# Patient Record
Sex: MALE | Employment: OTHER | URBAN - METROPOLITAN AREA
[De-identification: names, ages, dates, MRNs, and addresses within clinical notes are randomized per-mention and may not be internally consistent; named-entity substitution may affect disease eponyms.]

---

## 2018-01-30 PROBLEM — N18.30 STAGE 3 CHRONIC KIDNEY DISEASE (HCC): Status: ACTIVE | Noted: 2018-01-30

## 2018-01-30 PROBLEM — I25.10 CORONARY ARTERY DISEASE INVOLVING NATIVE CORONARY ARTERY OF NATIVE HEART WITHOUT ANGINA PECTORIS: Status: ACTIVE | Noted: 2018-01-30

## 2018-01-30 PROBLEM — I10 ESSENTIAL HYPERTENSION: Status: ACTIVE | Noted: 2018-01-30

## 2018-02-18 ENCOUNTER — APPOINTMENT (OUTPATIENT)
Dept: GENERAL RADIOLOGY | Facility: HOSPITAL | Age: 73
DRG: 215 | End: 2018-02-18
Attending: EMERGENCY MEDICINE
Payer: MEDICARE

## 2018-02-18 ENCOUNTER — HOSPITAL ENCOUNTER (INPATIENT)
Facility: HOSPITAL | Age: 73
LOS: 9 days | Discharge: HOME HEALTH | DRG: 215 | End: 2018-02-28
Attending: EMERGENCY MEDICINE | Admitting: HOSPITALIST
Payer: MEDICARE

## 2018-02-18 DIAGNOSIS — I25.10 CORONARY ARTERY DISEASE INVOLVING NATIVE CORONARY ARTERY OF NATIVE HEART WITHOUT ANGINA PECTORIS: ICD-10-CM
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
## 2018-02-18 DIAGNOSIS — N18.9 CHRONIC RENAL FAILURE, UNSPECIFIED CKD STAGE: ICD-10-CM

## 2018-02-18 DIAGNOSIS — J18.9 NOSOCOMIAL PNEUMONIA: Primary | ICD-10-CM

## 2018-02-18 DIAGNOSIS — N18.30 STAGE 3 CHRONIC KIDNEY DISEASE (HCC): ICD-10-CM

## 2018-02-18 DIAGNOSIS — E13.8 DM (DIABETES MELLITUS), SECONDARY, WITH COMPLICATIONS (HCC): ICD-10-CM

## 2018-02-18 DIAGNOSIS — I10 ESSENTIAL HYPERTENSION: ICD-10-CM

## 2018-02-18 DIAGNOSIS — I50.9 ACUTE ON CHRONIC CONGESTIVE HEART FAILURE, UNSPECIFIED HEART FAILURE TYPE (HCC): ICD-10-CM

## 2018-02-18 LAB
ALBUMIN SERPL-MCNC: 3.3 G/DL (ref 3.5–4.8)
ALP LIVER SERPL-CCNC: 42 U/L (ref 45–117)
ALT SERPL-CCNC: 28 U/L (ref 17–63)
AST SERPL-CCNC: 26 U/L (ref 15–41)
BASOPHILS # BLD AUTO: 0.05 X10(3) UL (ref 0–0.1)
BASOPHILS NFR BLD AUTO: 0.5 %
BILIRUB SERPL-MCNC: 1.2 MG/DL (ref 0.1–2)
BUN BLD-MCNC: 58 MG/DL (ref 8–20)
CALCIUM BLD-MCNC: 9.1 MG/DL (ref 8.3–10.3)
CHLORIDE: 102 MMOL/L (ref 101–111)
CO2: 24 MMOL/L (ref 22–32)
CREAT BLD-MCNC: 3.84 MG/DL (ref 0.7–1.3)
EOSINOPHIL # BLD AUTO: 0 X10(3) UL (ref 0–0.3)
EOSINOPHIL NFR BLD AUTO: 0 %
ERYTHROCYTE [DISTWIDTH] IN BLOOD BY AUTOMATED COUNT: 15.5 % (ref 11.5–16)
GLUCOSE BLD-MCNC: 218 MG/DL (ref 70–99)
HCT VFR BLD AUTO: 36.2 % (ref 37–53)
HGB BLD-MCNC: 11.8 G/DL (ref 13–17)
IMMATURE GRANULOCYTE COUNT: 0.05 X10(3) UL (ref 0–1)
IMMATURE GRANULOCYTE RATIO %: 0.5 %
LYMPHOCYTES # BLD AUTO: 1.02 X10(3) UL (ref 0.9–4)
LYMPHOCYTES NFR BLD AUTO: 10 %
M PROTEIN MFR SERPL ELPH: 7.7 G/DL (ref 6.1–8.3)
MCH RBC QN AUTO: 32 PG (ref 27–33.2)
MCHC RBC AUTO-ENTMCNC: 32.6 G/DL (ref 31–37)
MCV RBC AUTO: 98.1 FL (ref 80–99)
MONOCYTES # BLD AUTO: 1.09 X10(3) UL (ref 0.1–1)
MONOCYTES NFR BLD AUTO: 10.7 %
NEUTROPHIL ABS PRELIM: 7.97 X10 (3) UL (ref 1.3–6.7)
NEUTROPHILS # BLD AUTO: 7.97 X10(3) UL (ref 1.3–6.7)
NEUTROPHILS NFR BLD AUTO: 78.3 %
PLATELET # BLD AUTO: 197 10(3)UL (ref 150–450)
POTASSIUM SERPL-SCNC: 4 MMOL/L (ref 3.6–5.1)
RBC # BLD AUTO: 3.69 X10(6)UL (ref 3.8–5.8)
RED CELL DISTRIBUTION WIDTH-SD: 55.8 FL (ref 35.1–46.3)
SODIUM SERPL-SCNC: 135 MMOL/L (ref 136–144)
WBC # BLD AUTO: 10.2 X10(3) UL (ref 4–13)

## 2018-02-18 PROCEDURE — 71045 X-RAY EXAM CHEST 1 VIEW: CPT | Performed by: EMERGENCY MEDICINE

## 2018-02-19 ENCOUNTER — APPOINTMENT (OUTPATIENT)
Dept: CV DIAGNOSTICS | Facility: HOSPITAL | Age: 73
DRG: 215 | End: 2018-02-19
Attending: INTERNAL MEDICINE
Payer: MEDICARE

## 2018-02-19 ENCOUNTER — APPOINTMENT (OUTPATIENT)
Dept: ULTRASOUND IMAGING | Facility: HOSPITAL | Age: 73
DRG: 215 | End: 2018-02-19
Attending: INTERNAL MEDICINE
Payer: MEDICARE

## 2018-02-19 PROBLEM — J18.9 NOSOCOMIAL PNEUMONIA: Status: ACTIVE | Noted: 2018-02-19

## 2018-02-19 PROBLEM — D64.9 ANEMIA: Status: ACTIVE | Noted: 2018-02-19

## 2018-02-19 PROBLEM — E87.1 HYPONATREMIA: Status: ACTIVE | Noted: 2018-02-19

## 2018-02-19 PROBLEM — N18.9 CHRONIC RENAL FAILURE, UNSPECIFIED CKD STAGE: Status: ACTIVE | Noted: 2018-02-19

## 2018-02-19 PROBLEM — I50.9 ACUTE ON CHRONIC CONGESTIVE HEART FAILURE, UNSPECIFIED HEART FAILURE TYPE (HCC): Status: ACTIVE | Noted: 2018-02-19
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
## 2018-02-19 PROBLEM — R73.9 HYPERGLYCEMIA: Status: ACTIVE | Noted: 2018-02-19

## 2018-02-19 LAB
ATRIAL RATE: 71 BPM
BETA NATRIURETIC PEPTIDE: 1892 PG/ML (ref 2–99)
BILIRUB UR QL STRIP.AUTO: NEGATIVE
BUN BLD-MCNC: 58 MG/DL (ref 8–20)
CALCIUM BLD-MCNC: 8.6 MG/DL (ref 8.3–10.3)
CHLORIDE: 100 MMOL/L (ref 101–111)
CO2: 23 MMOL/L (ref 22–32)
COLOR UR AUTO: YELLOW
CREAT BLD-MCNC: 3.84 MG/DL (ref 0.7–1.3)
CREAT UR-SCNC: 78.4 MG/DL
EST. AVERAGE GLUCOSE BLD GHB EST-MCNC: 177 MG/DL (ref 68–126)
GLUCOSE BLD-MCNC: 214 MG/DL (ref 65–99)
GLUCOSE BLD-MCNC: 219 MG/DL (ref 70–99)
GLUCOSE BLD-MCNC: 260 MG/DL (ref 65–99)
GLUCOSE BLD-MCNC: 309 MG/DL (ref 65–99)
GLUCOSE BLD-MCNC: 335 MG/DL (ref 65–99)
GLUCOSE UR STRIP.AUTO-MCNC: NEGATIVE MG/DL
HBA1C MFR BLD HPLC: 7.8 % (ref ?–5.7)
HYALINE CASTS #/AREA URNS AUTO: PRESENT /LPF
KETONES UR STRIP.AUTO-MCNC: NEGATIVE MG/DL
LACTIC ACID: 1.3 MMOL/L (ref 0.5–2)
LACTIC ACID: 1.6 MMOL/L (ref 0.5–2)
LACTIC ACID: 1.6 MMOL/L (ref 0.5–2)
NITRITE UR QL STRIP.AUTO: NEGATIVE
P AXIS: 39 DEGREES
P-R INTERVAL: 172 MS
PH UR STRIP.AUTO: 5 [PH] (ref 4.5–8)
POTASSIUM SERPL-SCNC: 3.5 MMOL/L (ref 3.6–5.1)
PROT UR STRIP.AUTO-MCNC: NEGATIVE MG/DL
PROT UR-MCNC: 28.5 MG/DL
Q-T INTERVAL: 408 MS
QRS DURATION: 90 MS
QTC CALCULATION (BEZET): 443 MS
R AXIS: 1 DEGREES
RBC UR QL AUTO: NEGATIVE
SODIUM SERPL-SCNC: 136 MMOL/L (ref 136–144)
SODIUM SERPL-SCNC: 28 MMOL/L
SP GR UR STRIP.AUTO: 1.01 (ref 1–1.03)
T AXIS: -36 DEGREES
UROBILINOGEN UR STRIP.AUTO-MCNC: <2 MG/DL
VENTRICULAR RATE: 71 BPM

## 2018-02-19 PROCEDURE — 5A09457 ASSISTANCE WITH RESPIRATORY VENTILATION, 24-96 CONSECUTIVE HOURS, CONTINUOUS POSITIVE AIRWAY PRESSURE: ICD-10-PCS | Performed by: HOSPITALIST

## 2018-02-19 PROCEDURE — 99223 1ST HOSP IP/OBS HIGH 75: CPT | Performed by: INTERNAL MEDICINE

## 2018-02-19 PROCEDURE — 93306 TTE W/DOPPLER COMPLETE: CPT | Performed by: INTERNAL MEDICINE

## 2018-02-19 PROCEDURE — 76770 US EXAM ABDO BACK WALL COMP: CPT | Performed by: INTERNAL MEDICINE

## 2018-02-19 PROCEDURE — 99223 1ST HOSP IP/OBS HIGH 75: CPT | Performed by: HOSPITALIST

## 2018-02-19 RX ORDER — HEPARIN SODIUM 5000 [USP'U]/ML
5000 INJECTION, SOLUTION INTRAVENOUS; SUBCUTANEOUS EVERY 12 HOURS SCHEDULED
Status: DISCONTINUED | OUTPATIENT
Start: 2018-02-19 | End: 2018-02-28

## 2018-02-19 RX ORDER — FUROSEMIDE 10 MG/ML
40 INJECTION INTRAMUSCULAR; INTRAVENOUS ONCE
Status: COMPLETED | OUTPATIENT
Start: 2018-02-19 | End: 2018-02-19

## 2018-02-19 RX ORDER — FUROSEMIDE 80 MG
80 TABLET ORAL DAILY
Status: DISCONTINUED | OUTPATIENT
Start: 2018-02-19 | End: 2018-02-19

## 2018-02-19 RX ORDER — CLOPIDOGREL BISULFATE 75 MG/1
75 TABLET ORAL DAILY
Status: DISCONTINUED | OUTPATIENT
Start: 2018-02-19 | End: 2018-02-19

## 2018-02-19 RX ORDER — FUROSEMIDE 40 MG/1
40 TABLET ORAL DAILY
Status: DISCONTINUED | OUTPATIENT
Start: 2018-02-19 | End: 2018-02-19

## 2018-02-19 RX ORDER — ATORVASTATIN CALCIUM 80 MG/1
80 TABLET, FILM COATED ORAL NIGHTLY
Status: DISCONTINUED | OUTPATIENT
Start: 2018-02-19 | End: 2018-02-24

## 2018-02-19 RX ORDER — DEXTROSE MONOHYDRATE 25 G/50ML
50 INJECTION, SOLUTION INTRAVENOUS
Status: DISCONTINUED | OUTPATIENT
Start: 2018-02-19 | End: 2018-02-28

## 2018-02-19 RX ORDER — ASPIRIN 81 MG/1
81 TABLET, CHEWABLE ORAL DAILY
Status: DISCONTINUED | OUTPATIENT
Start: 2018-02-19 | End: 2018-02-28

## 2018-02-19 RX ORDER — PANTOPRAZOLE SODIUM 40 MG/1
40 TABLET, DELAYED RELEASE ORAL
Status: DISCONTINUED | OUTPATIENT
Start: 2018-02-19 | End: 2018-02-19

## 2018-02-19 RX ORDER — AMINO ACIDS
TABLET,CHEWABLE ORAL DAILY
Status: DISCONTINUED | OUTPATIENT
Start: 2018-02-19 | End: 2018-02-22

## 2018-02-19 RX ORDER — ARIPIPRAZOLE 15 MG/1
40 TABLET ORAL ONCE
Status: COMPLETED | OUTPATIENT
Start: 2018-02-19 | End: 2018-02-19

## 2018-02-19 RX ORDER — FENOFIBRATE 67 MG/1
67 CAPSULE ORAL
Status: DISCONTINUED | OUTPATIENT
Start: 2018-02-19 | End: 2018-02-19

## 2018-02-19 RX ORDER — IPRATROPIUM BROMIDE AND ALBUTEROL SULFATE 2.5; .5 MG/3ML; MG/3ML
3 SOLUTION RESPIRATORY (INHALATION) EVERY 6 HOURS
Status: DISCONTINUED | OUTPATIENT
Start: 2018-02-19 | End: 2018-02-21

## 2018-02-19 RX ORDER — GABAPENTIN 300 MG/1
300 CAPSULE ORAL 2 TIMES DAILY
Status: DISCONTINUED | OUTPATIENT
Start: 2018-02-19 | End: 2018-02-28

## 2018-02-19 RX ORDER — CLOPIDOGREL BISULFATE 75 MG/1
75 TABLET ORAL DAILY
Status: DISCONTINUED | OUTPATIENT
Start: 2018-02-20 | End: 2018-02-26

## 2018-02-19 RX ORDER — FUROSEMIDE 10 MG/ML
40 INJECTION INTRAMUSCULAR; INTRAVENOUS
Status: DISCONTINUED | OUTPATIENT
Start: 2018-02-19 | End: 2018-02-20

## 2018-02-19 RX ORDER — ACETAMINOPHEN 500 MG
1000 TABLET ORAL ONCE
Status: COMPLETED | OUTPATIENT
Start: 2018-02-19 | End: 2018-02-19

## 2018-02-19 RX ORDER — ISOSORBIDE MONONITRATE 30 MG/1
30 TABLET, EXTENDED RELEASE ORAL DAILY
Status: DISCONTINUED | OUTPATIENT
Start: 2018-02-19 | End: 2018-02-28

## 2018-02-19 NOTE — CONSULTS
[facility] Cardiology Consultation Mc Louise MD    The patient was interviewed, examined, the chart was reviewed and the consult was dictated. This is a 81year old male with a chief complaint of shortness of breath. Impression:  1.   Dyspnea–multifactor

## 2018-02-19 NOTE — CONSULTS
[facility]  Report of Consultation    [address] Patient Status:  Inpatient    1945 MRN [id number]   [facility] 2NE-A Attending Stanley Fleischer, MD   Hosp Day # 0 PCP PHYSICIAN NONSTAFF     Reason for Consultation:  Renal insuffic Mother      kidney failure   • Obesity Sister       reports that he quit smoking about 48 years ago. He has never used smokeless tobacco. He reports that he does not drink alcohol or use drugs.     Allergies:  No Known Allergies    Current Medications:    C and rhythm. No murmurs. Equal pulses   Abdomen: Soft, nontender, nondistended. Positive bowel sounds. No rebound tenderness   Neurologic: No focal neurological deficits. Musculoskeletal: Full range of motion of all extremities.  Trace edema   Integument w/u @ [facility]  [facility] showed severe 3 vessel disease - following with cardiology @ [facility].   - cardiology consulted  4. HTN - controlled  5. DM  6. HL      Thank you for allowing me to participate in this patient's care.   Please feel free to call me with any questions o

## 2018-02-19 NOTE — ED NOTES
PCT 2 CTU WITH REPRT GIVEN TO AND ACCEPTED BY MITESH DA SILVA. PREPARED FOR TX TO 2624 PER CART VIA TRANSPORT.

## 2018-02-19 NOTE — CM/SW NOTE
THE [facility] are both IN network with [employer] insurance per [facility] (Isai). Jennie Shook will follow up on physician's network status.     Abhijeet Perez RN/CM

## 2018-02-19 NOTE — CM/SW NOTE
Asked to check if physicians on consult are in-network with patient's insurance, just moved from [place], has had difficulty in getting hospital care and outpt care coordinated.  Lengthy call to [facility] [phone], spoke to [address] [place] [address], unable t

## 2018-02-19 NOTE — CONSULTS
[facility]    PATIENT'S NAME: Kerri Dillard   ATTENDING PHYSICIAN: Justice Pritchett M.D.   CONSULTING PHYSICIAN: Adis Maldonado M.D.    PATIENT ACCOUNT#:   [de-identified]    LOCATION:  [address] [place] [address]  MEDICAL RECORD #:   [id number]       DATE OF BIRTH:  [birth year] ILLNESS:  This is a complicated patient. This is a patient with underlying coronary disease, hypertension, and hypercholesterolemia who took a trip with his family around the holidays to [place]. There, he got sick.   He was in the hospital with a non-S is feeling better. He had some chest discomfort when he was acutely ill. He denies chest discomfort now. He is found to be in worsened renal insufficiency at this time. Presently, the patient is weak.   He denies present chest pain or shortness of breat Patient with obliterative coronary disease, dilated cardiomyopathy, heart failure, pneumonia, renal failure. PLAN:  The patient has been struggling with lack of coordination of care.   It has been quite unclear where the patient is covered, and family ha

## 2018-02-19 NOTE — H&P
[facility]  History and Physical     [address] Patient Status:  Emergency    1945 MRN [id number]   Location [address] [facility] Attending Denise Ruiz MD   Hosp Day # 0 PCP PHYSICIAN NONSTAFF     Chief Complaint: facility-administered medications on file prior to encounter. Current Outpatient Prescriptions on File Prior to Encounter:  aspirin 81 MG Oral Chew Tab Chew 81 mg by mouth daily. Disp:  Rfl:    atorvastatin 80 MG Oral Tab Take 80 mg by mouth nightly.    D No rebound, guarding or organomegaly. Neurologic: No focal neurological deficits. CNII-XII grossly intact. Musculoskeletal: Moves all extremities. Extremities: No edema or cyanosis. Integument: No rashes or lesions.    Psychiatric: Appropriate mood and

## 2018-02-19 NOTE — CM/SW NOTE
02/19/18 1620   CM/SW Referral Data   Referral Source Physician   Reason for Referral Discharge planning   Informant Patient   Pertinent Medical Hx   Primary Care Physician Name dr Yasmeen Levy   Patient Info   Patient's Mental Status Alert;Oriented   Pa

## 2018-02-19 NOTE — PROGRESS NOTES
[facility]  Progress Note     [address] Patient Status:  Inpatient    1945 MRN [id number]   [facility] 2NE-A Attending Hansa Iniguez MD   Hosp Day # 0 PCP PHYSICIAN NONSTAFF     Chief Complaint: SOB, cough, weakness, fatigu azithromycin  500 mg Intravenous Q24H   • piperacillin-tazobactam  4.5 g Intravenous Q12H   • Heparin Sodium (Porcine)  5,000 Units Subcutaneous 2 times per day   • ipratropium-albuterol  3 mL Nebulization Q6H   • cholecalciferol  5,000 Units Oral Daily Shashank Bocanegra, ELIANE,   2/19/2018  [id number]

## 2018-02-19 NOTE — ED PROVIDER NOTES
Patient Seen in: [facility] 2ne-a    History   Patient presents with:  Fever (infectious)  Congestive Heart Failure (cardiovascular)    Stated Complaint: fever, dyspnea    HPI    Patient is a 28-year-old male comes emergency room for evaluation of f complaint: fever, dyspnea  Other systems are as noted in HPI. Constitutional and vital signs reviewed. All other systems reviewed and negative except as noted above.     Physical Exam   ED Triage Vitals [02/18/18 2324]  BP: 116/62  Pulse: 71  Resp: 28 Abnormal; Notable for the following:     RBC 3.69 (*)     HGB 11.8 (*)     HCT 36.2 (*)     RDW-SD 55.8 (*)     Neutrophil Absolute Prelim 7.97 (*)     Neutrophil Absolute 7.97 (*)     Monocyte Absolute 1.09 (*)     All other components within normal limit admitted to the hospital for further workup.       Admission disposition: 2/19/2018 12:08 AM           Disposition and Plan     Clinical Impression:  Nosocomial pneumonia  (primary encounter diagnosis)  Acute on chronic congestive heart failure, unspecified

## 2018-02-19 NOTE — PAYOR COMM NOTE
--------------  Order Requisition   Admit to inpatient Once (Order #[id number]) on 2/19/18     ADMISSION REVIEW     Payor: Enma Wheat [place] #:  [id number]  Authorization Number: N/A    Admit date: 2/19/18  Admit time: 0246       Patient accommodation, extraocular motion is intact, sclerae white, conjunctiva is pink. Oropharynx is unremarkable, no exudate. NECK: Supple, trachea midline, no lymphadenopathy. LUNG: Crackles bilateral bases right greater than left.   CARDIOVASCULAR: Regular Acute on chronic congestive heart failure, Chronic renal failure, unspecified CKD stage    Present on Admission           ICD-10-CM Noted POA    * (Principal)Nosocomial pneumonia J18.9 2/19/2018 Unknown    Acute on chronic congestive heart failure, unspeci angioplasty         Past Surgical History: Past Surgical History:  No date: CABG N/A    Current Outpatient Prescriptions on File Prior to Encounter:  aspirin 81 MG Oral Chew Tab   atorvastatin 80 MG Oral Tab   Clopidogrel Bisulfate 75 MG Oral Tab   Cyanoco wife reports that he was schedule to get a temp HD cath placed today to initiate HD prior to his planned PCI.     Currently he denies any cp. + cough. + low grade fevers  CXR shows R LL infiltrate. Cr 3.8    Impression:  1.  CKD - stage IV; suspect DM/HT lives out of state and was transferred from [place] to [employer] where he is out of network. He came to see me and I referred him to Dr. Prabhu Carreno who was out of network. I referred him to [facility] where he was scheduled to have supported PCI. EXTRA STRENGTH) tab 1,000 mg     Date Action Dose Route User    2/19/2018 0213 Given 1000 mg Oral Dominguez Clemons RN      aspirin chewable tab 81 mg     Date Action Dose Route User    2/19/2018 0956 Given 81 mg Oral AVINASH Núñezrom Intravenous Kathi Elliott      piperacillin-tazobactam (ZOSYN) 4.5g/100ml premix     Date Action Dose Route User    2/19/2018 0104 New Bag 4.5 g Intravenous Sarah Beth Medina RN      piperacillin-tazobactam (ZOSYN) 4.5g/100ml premix     Date Action Dose Ro

## 2018-02-19 NOTE — PLAN OF CARE
Problem: Diabetes/Glucose Control  Goal: Glucose maintained within prescribed range  INTERVENTIONS:  - Monitor Blood Glucose as ordered  - Assess for signs and symptoms of hyperglycemia and hypoglycemia  - Administer ordered medications to maintain glucose electrolytes and administer replacement therapy as ordered   Outcome: Progressing  Vpaced    Problem: RESPIRATORY - ADULT  Goal: Achieves optimal ventilation and oxygenation  INTERVENTIONS:  - Assess for changes in respiratory status  - Assess for changes prescribed range  INTERVENTIONS:  - Monitor Blood Glucose as ordered  - Assess for signs and symptoms of hyperglycemia and hypoglycemia  - Administer ordered medications to maintain glucose within target range  - Assess barriers to adequate nutritional int managing their own health  - Refer to Case Management Department for coordinating discharge planning if the patient needs post-hospital services based on physician/LIP order or complex needs related to functional status, cognitive ability or social support

## 2018-02-19 NOTE — PROGRESS NOTES
[facility] Pharmacy Note:  Renal Adjustment for piperacillin/tazobactam (Kayleigh Aparicio)    Mary Miranda is a 81year old male who has been prescribed piperacillin/tazobactam (ZOSYN) 3.375 gm every 8 hrs.   CrCl is estimated creatinine clearance is 16.7 mL/min (based on S

## 2018-02-20 ENCOUNTER — APPOINTMENT (OUTPATIENT)
Dept: GENERAL RADIOLOGY | Facility: HOSPITAL | Age: 73
DRG: 215 | End: 2018-02-20
Attending: HOSPITALIST
Payer: MEDICARE

## 2018-02-20 PROBLEM — I50.9 ACUTE ON CHRONIC CONGESTIVE HEART FAILURE (HCC): Status: ACTIVE | Noted: 2018-02-19

## 2018-02-20 LAB
BUN BLD-MCNC: 62 MG/DL (ref 8–20)
CALCIUM BLD-MCNC: 8.9 MG/DL (ref 8.3–10.3)
CHLORIDE: 103 MMOL/L (ref 101–111)
CO2: 27 MMOL/L (ref 22–32)
CREAT BLD-MCNC: 4.22 MG/DL (ref 0.7–1.3)
EST. AVERAGE GLUCOSE BLD GHB EST-MCNC: 180 MG/DL (ref 68–126)
GLUCOSE BLD-MCNC: 168 MG/DL (ref 65–99)
GLUCOSE BLD-MCNC: 217 MG/DL (ref 65–99)
GLUCOSE BLD-MCNC: 271 MG/DL (ref 65–99)
GLUCOSE BLD-MCNC: 90 MG/DL (ref 65–99)
GLUCOSE BLD-MCNC: 94 MG/DL (ref 70–99)
HBA1C MFR BLD HPLC: 7.9 % (ref ?–5.7)
LEGIONELLA PNEUMOPHILA AG, URI: NEGATIVE
POTASSIUM SERPL-SCNC: 3.5 MMOL/L (ref 3.6–5.1)
PROCALCITONIN SERPL-MCNC: 0.48 NG/ML (ref ?–0.11)
SODIUM SERPL-SCNC: 139 MMOL/L (ref 136–144)

## 2018-02-20 PROCEDURE — 71046 X-RAY EXAM CHEST 2 VIEWS: CPT | Performed by: HOSPITALIST

## 2018-02-20 PROCEDURE — 99233 SBSQ HOSP IP/OBS HIGH 50: CPT | Performed by: INTERNAL MEDICINE

## 2018-02-20 PROCEDURE — 99232 SBSQ HOSP IP/OBS MODERATE 35: CPT | Performed by: INTERNAL MEDICINE

## 2018-02-20 RX ORDER — FUROSEMIDE 10 MG/ML
40 INJECTION INTRAMUSCULAR; INTRAVENOUS DAILY
Status: DISCONTINUED | OUTPATIENT
Start: 2018-02-20 | End: 2018-02-25

## 2018-02-20 RX ORDER — ARIPIPRAZOLE 15 MG/1
40 TABLET ORAL ONCE
Status: COMPLETED | OUTPATIENT
Start: 2018-02-20 | End: 2018-02-20

## 2018-02-20 NOTE — PROGRESS NOTES
[facility]  Progress Note     [address] Patient Status:  Inpatient    1945 MRN [id number]   [facility] 2NE-A Attending Flor Cuevas MD   Hosp Day # 1 PCP PHYSICIAN NONSTAFF     Chief Complaint: SOB, cough, weakness, fatigu Mononitrate ER  30 mg Oral Daily   • Metoprolol Succinate ER  12.5 mg Oral Nightly   • azithromycin  500 mg Intravenous Q24H   • piperacillin-tazobactam  4.5 g Intravenous Q12H   • Heparin Sodium (Porcine)  5,000 Units Subcutaneous 2 times per day   • ipra breath better today, off oxygen and pulse ox 97% on room air    BP (!) 89/54 (BP Location: Left arm)   Pulse 74   Temp 97.7 °F (36.5 °C) (Oral)   Resp 19   Ht 5' 7\" (1.702 m)   Wt 248 lb 7.3 oz (112.7 kg)   SpO2 97%   BMI 38.91 kg/m²     General appearanc negative. 2. Diarrhea–stool C. difficile negative. 3. Atelectasis with small bilateral pleural effusions right greater than left–incentive spirometry every hours while awake. Patient on IV Lasix.   4. CAD with dilated cardiomyopathy, acute on chronic chr

## 2018-02-20 NOTE — PLAN OF CARE
Problem: Diabetes/Glucose Control  Goal: Glucose maintained within prescribed range  INTERVENTIONS:  - Monitor Blood Glucose as ordered  - Assess for signs and symptoms of hyperglycemia and hypoglycemia  - Administer ordered medications to maintain glucose suctioning and perform as needed  - Assess and instruct to report SOB or any respiratory difficulty  - Respiratory Therapy support as indicated  - Manage/alleviate anxiety  - Monitor for signs/symptoms of CO2 retention   Outcome: Progressing      Problem: of redness and/or skin breakdown  - Initiate interventions, skin care algorithm/standards of care as needed   Outcome: Progressing      Problem: SAFETY ADULT - FALL  Goal: Free from fall injury  INTERVENTIONS:  - Assess pt frequently for physical needs  -

## 2018-02-20 NOTE — PROGRESS NOTES
[facility]  Progress Note    [address] Patient Status:  Inpatient    1945 MRN [id number]   [facility] 2NE-A Attending Rangel Peralta MD   Hosp Day # 1 PCP PHYSICIAN NONSTAFF     Feels better today  + cough  No f/c      Current source Oral, resp. rate 18, height 67\", weight 248 lb 7.3 oz, SpO2 99 %. General: No acute distress. Alert and oriented x 3. HEENT: Moist mucous membranes. EOM-I. PERRL  Neck: No lymphadenopathy. No JVD. No carotid bruits.   Respiratory: Clear anteriorl participate in this patient's care. Please feel free to call me with any questions or concerns.     Jayne Hood MD  2/20/2018

## 2018-02-20 NOTE — CM/SW NOTE
Verified with patient spelling of pcp, Dr Cheko Castaneda [phone]. Reviewed with patient that [facility] is not in network, in network list given, requests Aj Miu. Referral sent via ecin.     Call to Dr Wilder French office to f/u on insurance verification, still

## 2018-02-20 NOTE — RESPIRATORY THERAPY NOTE
MARLI - Equipment Use Daily Summary:                  . Set Mode:                . Usage in hours:                . 90% Pressure (EPAP) level:                . 90% Insp. Pressure (IPAP): Rudy Kauffman AHI:                .  Supplemental Oxygen:

## 2018-02-20 NOTE — CONSULTS
[address] Patient Status:  Inpatient    1945 MRN [id number]   [facility] 2NE-A Attending Primitivo Copeland MD   [facility] Day # 1 PCP PHYSICIAN NONSTAFF     Date of Admission: 2018 11:02 PM  Admission Diagnosis: Ayesha Thurston Congestive heart disease ([facility]    • Diabetes ([facility]    • Essential hypertension    • Hearing impairment    • Heart attack ([facility]    • High blood pressure    • High cholesterol    • History of angioplasty    • Neuropathy    • Renal disorder    • Sleep apnea nightly. Disp:  Rfl:    Amino Acids (DAILY AMINO 6000 OR) Take 400 Units by mouth daily.  Disp:  Rfl:         Current Medications:    Current Facility-Administered Medications:   •  furosemide (LASIX) injection 40 mg, 40 mg, Intravenous, Daily  •  benzocain fevers, chills, night sweats, or fatigue  HEENT: denies vision or hearing changes, eye pain, tinnitus, hearing loss, sore throat, epistaxis, sinus congestion  Cardiovascular: denies chest pain, PND, palpitations, edema, orthopnea, or syncope  Respiratory: deformity.                         UCAHY: SBQXLZO rate and rhythm, normal S1S2                          Abdomen: soft, non-tender, non-distended, positive BS.                         Extremity: No clubbing or cyanosis.  trace edema                         mobilization as appropriate from surgical standpoint, DVT prophylaxis, and use of CPAP until sedation has completely worn off following procedure.   RLL pneumonia: improving on current antibiotic course  -repeat CXR with improving RLL infiltrate  -dyspnea i

## 2018-02-20 NOTE — PROGRESS NOTES
[facility] Cardiology Progress Note - [address] Patient Status:  Inpatient    1945 MRN [id number]   [facility] 2NE-A Attending Marcelo Odonnell MD   [facility] Day # 1 PCP PHYSICIAN NONSTAFF     Subjective:   The patient 02/20/18 1100   Gross per 24 hour   Intake             1000 ml   Output             1650 ml   Net             -650 ml       Last 3 Weights  02/20/18 0449 : 248 lb 7.3 oz (112.7 kg)  02/19/18 0258 : 250 lb 7.1 oz (113.6 kg)  02/18/18 2324 : 250 lb (113.4 kg metoprolol succinate (Toprol XL) partial tablet 12.5 mg 12.5 mg Oral Nightly   azithromycin (ZITHROMAX) 500 mg in sodium chloride 0.9 % 250 mL IVPB 500 mg Intravenous Q24H   piperacillin-tazobactam (ZOSYN) 4.5g/100ml premix 4.5 g Intravenous Q12H   Hepar

## 2018-02-20 NOTE — HOME CARE LIAISON
Referral received for home health but unfortunately, [facility] is not contracted with this patient's insurance.

## 2018-02-20 NOTE — PHYSICAL THERAPY NOTE
PHYSICAL THERAPY QUICK EVALUATION - INPATIENT    Room Number: 5792/3289-P  Evaluation Date: 2/20/2018  Presenting Problem: fever, SOB + PNA, CHF  Physician Order: PT Eval and Treat  History:  Admitted from home with fever, SOB and fatigue, + PNA and CHF easily    SUBJECTIVE  \"I have numbness in my feet\"    OBJECTIVE  Precautions: Cardiac;Hard of hearing (recent pacemaker)  Fall Risk: Standard fall risk    WEIGHT BEARING RESTRICTION  Weight Bearing Restriction: None                PAIN ASSESSMENT  Rating Transfers to stand independently. Gait trained without device on levels modified independently with decreased triny and flexed posture.   Energy conservation techniques reviewed with patient and family including pacing self, seated rest, performing activ

## 2018-02-21 LAB
BUN BLD-MCNC: 63 MG/DL (ref 8–20)
CALCIUM BLD-MCNC: 9.2 MG/DL (ref 8.3–10.3)
CHLORIDE: 105 MMOL/L (ref 101–111)
CO2: 24 MMOL/L (ref 22–32)
CREAT BLD-MCNC: 4.19 MG/DL (ref 0.7–1.3)
GLUCOSE BLD-MCNC: 120 MG/DL (ref 65–99)
GLUCOSE BLD-MCNC: 128 MG/DL (ref 65–99)
GLUCOSE BLD-MCNC: 136 MG/DL (ref 70–99)
GLUCOSE BLD-MCNC: 148 MG/DL (ref 65–99)
GLUCOSE BLD-MCNC: 169 MG/DL (ref 65–99)
POTASSIUM SERPL-SCNC: 3.6 MMOL/L (ref 3.6–5.1)
SODIUM SERPL-SCNC: 141 MMOL/L (ref 136–144)
STREPTOCOCCUS PNEUMONIAE AG, U: NEGATIVE

## 2018-02-21 PROCEDURE — 99232 SBSQ HOSP IP/OBS MODERATE 35: CPT | Performed by: INTERNAL MEDICINE

## 2018-02-21 RX ORDER — IPRATROPIUM BROMIDE AND ALBUTEROL SULFATE 2.5; .5 MG/3ML; MG/3ML
3 SOLUTION RESPIRATORY (INHALATION)
Status: DISCONTINUED | OUTPATIENT
Start: 2018-02-21 | End: 2018-02-25

## 2018-02-21 NOTE — PROGRESS NOTES
[facility]  Progress Note     [address] Patient Status:  Inpatient    1945 MRN [id number]   [facility] 2NE-A Attending Tara Longoria MD   [facility] Day # 2 PCP PHYSICIAN NONSTAFF     Chief Complaint: SOB, cough, weakness, fatigu Oral Daily   • atorvastatin  80 mg Oral Nightly   • gabapentin  300 mg Oral BID   • Isosorbide Mononitrate ER  30 mg Oral Daily   • Metoprolol Succinate ER  12.5 mg Oral Nightly   • piperacillin-tazobactam  4.5 g Intravenous Q12H   • Heparin Sodium (Porcin Dr Violeta Castillo, NP,   2/21 /2018  [id number]    Addendum:     Agree with above note by JAMES Jack. Patient seen and examined.   Shortness of breath better today, off oxygen and pulse ox 97% on room air     BP (!) 88/46   Pulse 66   Temp 97.9 °F IV  7. Hypokalemia- replace  8.  Insulin-dependent diabetes mellitus type 2–hyperglycemia protocol      Discharge planning when okay with cardiology, follow-up with cardiology as directed and with regular primary care physician in 1 week in office, follow u

## 2018-02-21 NOTE — PROGRESS NOTES
[facility]  Nephrology Progress Note    [address] Patient Status:  Inpatient    1945 MRN [id number]   [facility] 2NE-A Attending Maki Pearson MD   [facility] Day # 2 PCP PHYSICIAN NONSTAFF       SUBJECTIVE:  Remains somewhat SOB to Facility-Administered Medications:  furosemide (LASIX) injection 40 mg 40 mg Intravenous Daily   benzocaine-menthol (CEPACOL (SUGAR-FREE)) 1 lozenge 1 lozenge Oral PRN   DAILY AMINO 6000 CHEW  Oral Daily   aspirin chewable tab 81 mg 81 mg Oral Daily   ator plan for complex PCI once pneumonia treated    #4. HTN- BP has been low. Will follow      Questions/concerns were discussed with patient and/or family by bedside.           Jerome Roque  2/21/2018  11:08 AM

## 2018-02-21 NOTE — CM/SW NOTE
Call received from [facility], benefits ran and insurance plan out of network. Referrals sent to [address] [place] [address] and [facility] per insurance website via [address] [facility].     Poonam Crowe RN,   Phone [phone]  Pager [phone]

## 2018-02-21 NOTE — PROGRESS NOTES
[address] Patient Status:  Inpatient    1945 MRN [id number]   [facility] 2NE-A Attending Stanley Fleischer, MD   [address] Day # 2 PCP PHYSICIAN NONSTAFF     Pulm / Critical Care Progress Note     S: pt states that he feel atraumatic. Lungs: slightly diminished R base   Chest wall: No tenderness or deformity. Heart: Regular rate and rhythm, normal S1S2, no murmur. Abdomen: soft, non-tender, non-distended, positive BS.    Extremity: no edema         Recent Labs   Lab  02

## 2018-02-21 NOTE — PROGRESS NOTES
[facility] Cardiology Progress Note - [address] Patient Status:  Inpatient    1945 MRN [id number]   [facility] 2NE-A Attending Alpesh Pinto MD   [facility] Day # 2 PCP PHYSICIAN NONSTAFF     Subjective:  Patient is (113.6 kg)  02/18/18 2324 : 250 lb (113.4 kg)  01/30/18 1402 : 247 lb (112 kg)      Tele: NSR    Physical Exam:    General: Alert and oriented x 3. No apparent distress. No respiratory or constitutional distress.   HEENT: Normocephalic, anicteric sclera, ne (NEURONTIN) cap 300 mg 300 mg Oral BID   Isosorbide Mononitrate ER (IMDUR) 24 hr tab 30 mg 30 mg Oral Daily   metoprolol succinate (Toprol XL) partial tablet 12.5 mg 12.5 mg Oral Nightly   piperacillin-tazobactam (ZOSYN) 4.5g/100ml premix 4.5 g Intravenous

## 2018-02-22 LAB
BUN BLD-MCNC: 60 MG/DL (ref 8–20)
CALCIUM BLD-MCNC: 9.4 MG/DL (ref 8.3–10.3)
CHLORIDE: 103 MMOL/L (ref 101–111)
CO2: 25 MMOL/L (ref 22–32)
CREAT BLD-MCNC: 4.1 MG/DL (ref 0.7–1.3)
GLUCOSE BLD-MCNC: 124 MG/DL (ref 65–99)
GLUCOSE BLD-MCNC: 165 MG/DL (ref 65–99)
GLUCOSE BLD-MCNC: 169 MG/DL (ref 65–99)
GLUCOSE BLD-MCNC: 95 MG/DL (ref 65–99)
GLUCOSE BLD-MCNC: 97 MG/DL (ref 70–99)
POTASSIUM SERPL-SCNC: 3.6 MMOL/L (ref 3.6–5.1)
SODIUM SERPL-SCNC: 139 MMOL/L (ref 136–144)

## 2018-02-22 PROCEDURE — 99232 SBSQ HOSP IP/OBS MODERATE 35: CPT | Performed by: INTERNAL MEDICINE

## 2018-02-22 RX ORDER — SODIUM CHLORIDE 9 MG/ML
INJECTION, SOLUTION INTRAVENOUS CONTINUOUS
Status: CANCELLED | OUTPATIENT
Start: 2018-02-22

## 2018-02-22 RX ORDER — ASPIRIN 81 MG/1
324 TABLET, CHEWABLE ORAL ONCE
Status: CANCELLED | OUTPATIENT
Start: 2018-02-22 | End: 2018-02-22

## 2018-02-22 RX ORDER — GUAIFENESIN 600 MG
600 TABLET, EXTENDED RELEASE 12 HR ORAL 2 TIMES DAILY PRN
Status: DISCONTINUED | OUTPATIENT
Start: 2018-02-22 | End: 2018-02-28

## 2018-02-22 NOTE — CM/SW NOTE
Call from [address] [place] [address]. [phone], can accept case, provided pcp information via ecin.      Poonam Crowe RN,   Phone [phone]  Pager [phone]

## 2018-02-22 NOTE — PROGRESS NOTES
[facility] Cardiology Progress Note - [address] Patient Status:  Inpatient    1945 MRN [id number]   [facility] 2NE-A Attending Sara Knight MD   Hosp Day # 3 PCP PHYSICIAN NONSTAFF     Subjective:  Patient is : 247 lb (112 kg)      Tele: NSR    Physical Exam:    General: Alert and oriented x 3. No apparent distress. No respiratory or constitutional distress. HEENT: Normocephalic, anicteric sclera, neck supple, no thyromegaly or adenopathy.   Neck: No JVD, carot 80 mg Oral Nightly   gabapentin (NEURONTIN) cap 300 mg 300 mg Oral BID   Isosorbide Mononitrate ER (IMDUR) 24 hr tab 30 mg 30 mg Oral Daily   metoprolol succinate (Toprol XL) partial tablet 12.5 mg 12.5 mg Oral Nightly   piperacillin-tazobactam (ZOSYN) 4.5

## 2018-02-22 NOTE — PROGRESS NOTES
[facility]  Progress Note     [address] Patient Status:  Inpatient    1945 MRN [id number]   [facility] 2NE-A Attending Ling Bell MD   Hosp Day # 3 PCP PHYSICIAN NONSTAFF     Chief Complaint: SOB, cough, weakness, fatigu piperacillin-tazobactam  4.5 g Intravenous Q12H   • Heparin Sodium (Porcine)  5,000 Units Subcutaneous 2 times per day   • cholecalciferol  5,000 Units Oral Daily   • Clopidogrel Bisulfate  75 mg Oral Daily   • insulin detemir  15 Units Subcutaneous [email or web address] BMI 39.57 kg/m²           General appearance: alert and cooperative  Head: Normocephalic, without obvious abnormality, atraumatic  Throat: oral mucosa moist  Neck: no adenopathy, no carotid bruit, no JVD  Lungs: clear to auscultation bilaterally  Heart: S1 Ross Kauffman MD  2/22/2018

## 2018-02-22 NOTE — CM/SW NOTE
Received call from Beto at Dr Zarate Compton office [phone] confirmed that Dr [place] is within patient's insurance network. Page out to Kel RAMIREZ to notify.      Rasheed De La Torre RN,   Phone [phone]  Pager [phone]

## 2018-02-22 NOTE — PROGRESS NOTES
[facility]  Nephrology Progress Note    [address] Patient Status:  Inpatient    1945 MRN [id number]   [facility] 2NE-A Attending James Crason MD   Hosp Day # 3 PCP PHYSICIAN NONSTAFF       SUBJECTIVE:  Continues to complain o 2057  02/22/18   0713   PGLU  120*  148*  169*  128*  95       Meds:     Current Facility-Administered Medications:  Oxymetazoline HCl (NASAL DECONGESTANT SPRAY) 0.05 % nasal spray 1 spray 1 spray Each Nare Q8H PRN   ipratropium-albuterol (DUONEB) nebulize require renal replacement therapy in the somewhat near future and we have discussed the potential need for HD during this admission in particular given need for complex PCI. Not clear he would be a PD candidate with hx of gastric bypass. #2.  Pneumonia-

## 2018-02-22 NOTE — RESPIRATORY THERAPY NOTE
Plan to continue routine Q 6 W/A duoneb treatments. On room air, SpO2 is 96%. Breath sounds are diminished. Patient has a strong productive cough, with small amounts of  tan, yellow thick sputum.

## 2018-02-23 PROBLEM — I25.119 CORONARY ARTERY DISEASE INVOLVING NATIVE HEART WITH ANGINA PECTORIS (HCC): Status: ACTIVE | Noted: 2018-01-30

## 2018-02-23 LAB
BUN BLD-MCNC: 58 MG/DL (ref 8–20)
CALCIUM BLD-MCNC: 9.3 MG/DL (ref 8.3–10.3)
CHLORIDE: 104 MMOL/L (ref 101–111)
CO2: 24 MMOL/L (ref 22–32)
CREAT BLD-MCNC: 4.02 MG/DL (ref 0.7–1.3)
GLUCOSE BLD-MCNC: 106 MG/DL (ref 65–99)
GLUCOSE BLD-MCNC: 120 MG/DL (ref 70–99)
GLUCOSE BLD-MCNC: 142 MG/DL (ref 65–99)
GLUCOSE BLD-MCNC: 160 MG/DL (ref 65–99)
GLUCOSE BLD-MCNC: 202 MG/DL (ref 65–99)
GLUCOSE BLD-MCNC: 217 MG/DL (ref 65–99)
POTASSIUM SERPL-SCNC: 3.6 MMOL/L (ref 3.6–5.1)
SODIUM SERPL-SCNC: 138 MMOL/L (ref 136–144)

## 2018-02-23 PROCEDURE — 99232 SBSQ HOSP IP/OBS MODERATE 35: CPT | Performed by: INTERNAL MEDICINE

## 2018-02-23 RX ORDER — POLYETHYLENE GLYCOL 3350 17 G/17G
17 POWDER, FOR SOLUTION ORAL DAILY PRN
Status: DISCONTINUED | OUTPATIENT
Start: 2018-02-23 | End: 2018-02-28

## 2018-02-23 RX ORDER — POLYETHYLENE GLYCOL 3350 17 G/17G
17 POWDER, FOR SOLUTION ORAL DAILY
Status: DISCONTINUED | OUTPATIENT
Start: 2018-02-23 | End: 2018-02-23

## 2018-02-23 NOTE — PROGRESS NOTES
[facility] Cardiology Progress Note - [address] Patient Status:  Inpatient    1945 MRN [id number]   [facility] 2NE-A Attending France Beasley MD   [facility] Day # 4 PCP PHYSICIAN NONSTAFF     Subjective:   The patient or adenopathy. Neck: No JVD, carotids 2+, no bruits. Cardiac: Regular rate and rhythm. S1, S2 normal. No murmur, pericardial rub, S3, thrill, heave or extra cardiac sounds. Lungs: Clear without wheezes, rales, rhonchi or dullness.   Normal excursions and 4.5g/100ml premix 4.5 g Intravenous Q12H   Heparin Sodium (Porcine) 5000 UNIT/ML injection 5,000 Units 5,000 Units Subcutaneous 2 times per day   cholecalciferol (VITAMIN D3) cap/tab 5,000 Units 5,000 Units Oral Daily   Clopidogrel Bisulfate (PLAVIX) tab 7

## 2018-02-23 NOTE — PROGRESS NOTES
Pulmonary Progress Note        NAME: [address] [place] [address] Street: 8293/2469-A - MRN: [id number] - Age: 81year old - : 1945        SUBJECTIVE: No events overnight, no complaints this afternoon, feels like cough is getting better still    OBJECTIVE:    BAND, INR in the last 72 hours.     Invalid input(s): LYM#, MONO#, BASOS#, EOSIN#      Recent Labs   02/21/18  0557 02/22/18  0524 02/23/18  0559    139 138   K 3.6 3.6 3.6    103 104   CO2 24.0 25.0 24.0   BUN 63* 60* 58*   CA 9.2 9.4 9.3

## 2018-02-23 NOTE — PROGRESS NOTES
[facility]  Progress Note     [address] Patient Status:  Inpatient    1945 MRN [id number]   [facility] 2NE-A Attending Tara Longoria MD   Hosp Day # 4 PCP PHYSICIAN NONSTAFF     Chief Complaint: SOB, cough, weakness, fatigu insulin detemir  15 Units Subcutaneous [email or web address]   • Insulin Aspart Pen  1-10 Units Subcutaneous TID AC and HS   • Insulin Aspart Pen  1-68 Units Subcutaneous TID CC       ASSESSMENT / PLAN:     1.  Healthcare facility acquired pneumonia rule out gram-n Awake,alert,nonfocal  Psych: Mood and affect appears normal           A/P as above with other plans as below.     1.  Pneumonia– possible gram-negative pneumonia–continue Zosyn, azithromycin.  Pneumonia clinically improving.    Elevated pro calcitonin level

## 2018-02-24 LAB
BUN BLD-MCNC: 56 MG/DL (ref 8–20)
CALCIUM BLD-MCNC: 9.4 MG/DL (ref 8.3–10.3)
CHLORIDE: 103 MMOL/L (ref 101–111)
CO2: 25 MMOL/L (ref 22–32)
CREAT BLD-MCNC: 3.81 MG/DL (ref 0.7–1.3)
GLUCOSE BLD-MCNC: 102 MG/DL (ref 65–99)
GLUCOSE BLD-MCNC: 103 MG/DL (ref 70–99)
GLUCOSE BLD-MCNC: 106 MG/DL (ref 65–99)
GLUCOSE BLD-MCNC: 125 MG/DL (ref 65–99)
GLUCOSE BLD-MCNC: 144 MG/DL (ref 65–99)
HAV IGM SER QL: 2 MG/DL (ref 1.7–3)
POTASSIUM SERPL-SCNC: 3.5 MMOL/L (ref 3.6–5.1)
SODIUM SERPL-SCNC: 138 MMOL/L (ref 136–144)

## 2018-02-24 PROCEDURE — 99232 SBSQ HOSP IP/OBS MODERATE 35: CPT | Performed by: INTERNAL MEDICINE

## 2018-02-24 RX ORDER — ROSUVASTATIN CALCIUM 20 MG/1
20 TABLET, COATED ORAL NIGHTLY
Status: DISCONTINUED | OUTPATIENT
Start: 2018-02-24 | End: 2018-02-28

## 2018-02-24 RX ORDER — POTASSIUM CHLORIDE 20 MEQ/1
20 TABLET, EXTENDED RELEASE ORAL ONCE
Status: COMPLETED | OUTPATIENT
Start: 2018-02-24 | End: 2018-02-24

## 2018-02-24 NOTE — PLAN OF CARE
Problem: Diabetes/Glucose Control  Goal: Glucose maintained within prescribed range  INTERVENTIONS:  - Monitor Blood Glucose as ordered  - Assess for signs and symptoms of hyperglycemia and hypoglycemia  - Administer ordered medications to maintain glucose symptoms of hyperglycemia and hypoglycemia  - Administer ordered medications to maintain glucose within target range  - Assess barriers to adequate nutritional intake and initiate nutrition consult as needed  - Instruct patient on self management of diabet

## 2018-02-24 NOTE — PLAN OF CARE
Alert. Oriented. sr per tele. Compliant w/ cpap. Hx marc. Denies cp, sob. C/o some leg cramping. scds in place. Incentive spirometry up to 1500. Afebrile. On iv atbx for rll pna. poc updated w/ pt. Cont. to monitor pt.

## 2018-02-24 NOTE — PROGRESS NOTES
Pulmonary Progress Note     Assessment / Plan:  1. Pulmonary Perioperative Risk Assessment: please see Dr. Cristin Orozco note from 2/20  2.  RLL pneumonia: improving on current antibiotic course  -on RA  -RVP negative  -sputum culture with no growth  -urine strep

## 2018-02-24 NOTE — PROGRESS NOTES
[facility] Cardiology Progress Note - [address] Patient Status:  Inpatient    1945 MRN [id number]   [facility] 2NE-A Attending Primitivo Copeland MD   [address] Day # 5 PCP PHYSICIAN NONSTAFF     Subjective:  His wife is NSR    Physical Exam:    General: Alert and oriented x 3. No apparent distress. No respiratory or constitutional distress. HEENT: Normocephalic, anicteric sclera, neck supple, no thyromegaly or adenopathy. Neck: No JVD, carotids 2+, no bruits.   Cardiac: (LIPITOR) tab 80 mg 80 mg Oral Nightly   gabapentin (NEURONTIN) cap 300 mg 300 mg Oral BID   Isosorbide Mononitrate ER (IMDUR) 24 hr tab 30 mg 30 mg Oral Daily   metoprolol succinate (Toprol XL) partial tablet 12.5 mg 12.5 mg Oral Nightly   piperacillin-ta

## 2018-02-24 NOTE — PROGRESS NOTES
[facility]  Nephrology Progress Note    [address] Patient Status:  Inpatient    1945 MRN [id number]   [facility] 2NE-A Attending Lauren Sharpe MD   [facility] Day # 5 PCP PHYSICIAN NONSTAFF       SUBJECTIVE:  No acute issues overnig Insulin Aspart Pen (NOVOLOG) 100 UNIT/ML flexpen 1-68 Units 1-68 Units Subcutaneous TID CC           Physical Exam:   BP 95/55 (BP Location: Left arm)   Pulse 65   Temp (!) 97.5 °F (36.4 °C) (Oral)   Resp 18   Ht 67\"   Wt 254 lb 3.1 oz   SpO2 98%   BMI follow      Questions/concerns were discussed with patient and/or family by bedside.       Alesia Manzano  2/24/2018

## 2018-02-24 NOTE — PROGRESS NOTES
[facility]  Progress Note    [address] Patient Status:  Inpatient    1945 MRN [id number]   [facility] 2NE-A Attending Pooja Sidhu MD   Hosp Day # 5 PCP PHYSICIAN NONSTAFF     CC: Shortness of breath, generalized weakness, fa reviewed in Epic.     Meds:     Current Facility-Administered Medications:  Rosuvastatin Calcium (CRESTOR) 20 MG tab 20 mg 20 mg Oral Nightly   Potassium Chloride ER (K-DUR M20) CR tab 20 mEq 20 mEq Oral Once   PEG 3350 (MIRALAX) powder packet 17 g 17 g Ora pneumonia–continue Zosyn, azithromycin.  Pneumonia clinically improving.    Elevated pro calcitonin level.  Pulmonary consult for pulmonary clearance for cardiac intervention, fever and chills resolved.  Blood culture with no growth for 1 day.  Urine cultu

## 2018-02-25 ENCOUNTER — APPOINTMENT (OUTPATIENT)
Dept: GENERAL RADIOLOGY | Facility: HOSPITAL | Age: 73
DRG: 215 | End: 2018-02-25
Attending: INTERNAL MEDICINE
Payer: MEDICARE

## 2018-02-25 LAB
BUN BLD-MCNC: 48 MG/DL (ref 8–20)
CALCIUM BLD-MCNC: 9.1 MG/DL (ref 8.3–10.3)
CHLORIDE: 106 MMOL/L (ref 101–111)
CO2: 26 MMOL/L (ref 22–32)
CREAT BLD-MCNC: 3.77 MG/DL (ref 0.7–1.3)
GLUCOSE BLD-MCNC: 102 MG/DL (ref 65–99)
GLUCOSE BLD-MCNC: 105 MG/DL (ref 65–99)
GLUCOSE BLD-MCNC: 113 MG/DL (ref 65–99)
GLUCOSE BLD-MCNC: 85 MG/DL (ref 65–99)
GLUCOSE BLD-MCNC: 86 MG/DL (ref 70–99)
HAV IGM SER QL: 2 MG/DL (ref 1.7–3)
POTASSIUM SERPL-SCNC: 3.8 MMOL/L (ref 3.6–5.1)
SODIUM SERPL-SCNC: 140 MMOL/L (ref 136–144)

## 2018-02-25 PROCEDURE — 99232 SBSQ HOSP IP/OBS MODERATE 35: CPT | Performed by: INTERNAL MEDICINE

## 2018-02-25 PROCEDURE — 71045 X-RAY EXAM CHEST 1 VIEW: CPT | Performed by: INTERNAL MEDICINE

## 2018-02-25 RX ORDER — SODIUM CHLORIDE 9 MG/ML
INJECTION, SOLUTION INTRAVENOUS CONTINUOUS
Status: DISCONTINUED | OUTPATIENT
Start: 2018-02-26 | End: 2018-02-26

## 2018-02-25 RX ORDER — IPRATROPIUM BROMIDE AND ALBUTEROL SULFATE 2.5; .5 MG/3ML; MG/3ML
3 SOLUTION RESPIRATORY (INHALATION) EVERY 6 HOURS PRN
Status: DISCONTINUED | OUTPATIENT
Start: 2018-02-25 | End: 2018-02-28

## 2018-02-25 NOTE — PLAN OF CARE
Problem: Diabetes/Glucose Control  Goal: Glucose maintained within prescribed range  INTERVENTIONS:  - Monitor Blood Glucose as ordered  - Assess for signs and symptoms of hyperglycemia and hypoglycemia  - Administer ordered medications to maintain glucose room air tolerated activity well    Problem: METABOLIC/FLUID AND ELECTROLYTES - ADULT  Goal: Hemodynamic stability and optimal renal function maintained  INTERVENTIONS:  - Monitor labs and assess for signs and symptoms of volume excess or deficit  - Monito

## 2018-02-25 NOTE — PROGRESS NOTES
[facility]  Progress Note    [address] Patient Status:  Inpatient    1945 MRN [id number]   [facility] 2NE-A Attending Alesia Nicole MD   Hosp Day # 6 PCP PHYSICIAN NONSTAFF     CC: Shortness of breath, generalized weakness, fa 26.0 02/25/2018   GLU 86 02/25/2018   CA 9.1 02/25/2018   MG 2.0 02/25/2018   PGLU 102 02/25/2018       Imaging:  Imaging reviewed in Epic.     Meds:     Current Facility-Administered Medications:  ipratropium-albuterol (DUONEB) nebulizer solution 3 mL 3 mL azithromycin.  Pneumonia clinically improving.    Elevated pro calcitonin level.  Pulmonary consult for pulmonary clearance for cardiac intervention, fever and chills resolved.  Blood culture with no growth for 1 day.  Urine culture with no growth for 1 da MD  2/25/2018

## 2018-02-25 NOTE — PLAN OF CARE
Alert. Oriented. sr per tele. Denies cp, sob. More conversant tonight. Feliberto/cpap- compliant. poc updated w/ pt. Cont. to monitor pt.

## 2018-02-25 NOTE — PROGRESS NOTES
[facility] Cardiology Progress Note - [address] Patient Status:  Inpatient    1945 MRN [id number]   [facility] 2NE-A Attending Rangel Peralta MD   [facility] Day # 6 PCP PHYSICIAN NONSTAFF     Subjective:  He had no c 3. No apparent distress. No respiratory or constitutional distress. HEENT: Normocephalic, anicteric sclera, neck supple, no thyromegaly or adenopathy. Neck: No JVD, carotids 2+, no bruits. Cardiac: Regular rate and rhythm.  S1, S2 normal. No murmur, joshua gabapentin (NEURONTIN) cap 300 mg 300 mg Oral BID   Isosorbide Mononitrate ER (IMDUR) 24 hr tab 30 mg 30 mg Oral Daily   metoprolol succinate (Toprol XL) partial tablet 12.5 mg 12.5 mg Oral Nightly   piperacillin-tazobactam (ZOSYN) 4.5g/100ml premix 4.5

## 2018-02-25 NOTE — PROGRESS NOTES
Pulmonary Progress Note     Assessment / Plan:  1. Pulmonary perioperative risk assessment:  -okay to proceed with PCI tomorrow from pulmonary perspective  2.  RLL pneumonia: improving  -on RA  -RVP negative  -sputum culture with no growth  -urine strep/leg

## 2018-02-26 ENCOUNTER — APPOINTMENT (OUTPATIENT)
Dept: INTERVENTIONAL RADIOLOGY/VASCULAR | Facility: HOSPITAL | Age: 73
DRG: 215 | End: 2018-02-26
Attending: INTERNAL MEDICINE
Payer: MEDICARE

## 2018-02-26 LAB
BASOPHILS # BLD AUTO: 0.05 X10(3) UL (ref 0–0.1)
BASOPHILS NFR BLD AUTO: 0.7 %
BUN BLD-MCNC: 48 MG/DL (ref 8–20)
CALCIUM BLD-MCNC: 9.2 MG/DL (ref 8.3–10.3)
CHLORIDE: 106 MMOL/L (ref 101–111)
CO2: 24 MMOL/L (ref 22–32)
CREAT BLD-MCNC: 3.87 MG/DL (ref 0.7–1.3)
EOSINOPHIL # BLD AUTO: 0.22 X10(3) UL (ref 0–0.3)
EOSINOPHIL NFR BLD AUTO: 3.2 %
ERYTHROCYTE [DISTWIDTH] IN BLOOD BY AUTOMATED COUNT: 15.5 % (ref 11.5–16)
GLUCOSE BLD-MCNC: 111 MG/DL (ref 65–99)
GLUCOSE BLD-MCNC: 62 MG/DL (ref 65–99)
GLUCOSE BLD-MCNC: 62 MG/DL (ref 65–99)
GLUCOSE BLD-MCNC: 64 MG/DL (ref 65–99)
GLUCOSE BLD-MCNC: 70 MG/DL (ref 70–99)
GLUCOSE BLD-MCNC: 73 MG/DL (ref 65–99)
GLUCOSE BLD-MCNC: 93 MG/DL (ref 65–99)
GLUCOSE BLD-MCNC: 94 MG/DL (ref 65–99)
GLUCOSE BLD-MCNC: 95 MG/DL (ref 65–99)
HAV IGM SER QL: 2.2 MG/DL (ref 1.7–3)
HCT VFR BLD AUTO: 34.9 % (ref 37–53)
HGB BLD-MCNC: 11.2 G/DL (ref 13–17)
IMMATURE GRANULOCYTE COUNT: 0.02 X10(3) UL (ref 0–1)
IMMATURE GRANULOCYTE RATIO %: 0.3 %
ISTAT ACTIVATED CLOTTING TIME: 169 SECONDS (ref 74–137)
ISTAT ACTIVATED CLOTTING TIME: 224 SECONDS (ref 74–137)
ISTAT ACTIVATED CLOTTING TIME: 252 SECONDS (ref 74–137)
ISTAT ACTIVATED CLOTTING TIME: 257 SECONDS (ref 74–137)
LYMPHOCYTES # BLD AUTO: 2.11 X10(3) UL (ref 0.9–4)
LYMPHOCYTES NFR BLD AUTO: 31.2 %
MCH RBC QN AUTO: 32.2 PG (ref 27–33.2)
MCHC RBC AUTO-ENTMCNC: 32.1 G/DL (ref 31–37)
MCV RBC AUTO: 100.3 FL (ref 80–99)
MONOCYTES # BLD AUTO: 0.69 X10(3) UL (ref 0.1–1)
MONOCYTES NFR BLD AUTO: 10.2 %
NEUTROPHIL ABS PRELIM: 3.68 X10 (3) UL (ref 1.3–6.7)
NEUTROPHILS # BLD AUTO: 3.68 X10(3) UL (ref 1.3–6.7)
NEUTROPHILS NFR BLD AUTO: 54.4 %
PLATELET # BLD AUTO: 222 10(3)UL (ref 150–450)
POTASSIUM SERPL-SCNC: 3.4 MMOL/L (ref 3.6–5.1)
RBC # BLD AUTO: 3.48 X10(6)UL (ref 3.8–5.8)
RED CELL DISTRIBUTION WIDTH-SD: 57.2 FL (ref 35.1–46.3)
SODIUM SERPL-SCNC: 139 MMOL/L (ref 136–144)
WBC # BLD AUTO: 6.8 X10(3) UL (ref 4–13)

## 2018-02-26 PROCEDURE — 027236Z DILATION OF CORONARY ARTERY, THREE ARTERIES WITH THREE DRUG-ELUTING INTRALUMINAL DEVICES, PERCUTANEOUS APPROACH: ICD-10-PCS | Performed by: INTERNAL MEDICINE

## 2018-02-26 PROCEDURE — 02HA3RJ INSERTION OF SHORT-TERM EXTERNAL HEART ASSIST SYSTEM INTO HEART, INTRAOPERATIVE, PERCUTANEOUS APPROACH: ICD-10-PCS | Performed by: INTERNAL MEDICINE

## 2018-02-26 PROCEDURE — 047H3ZZ DILATION OF RIGHT EXTERNAL ILIAC ARTERY, PERCUTANEOUS APPROACH: ICD-10-PCS | Performed by: INTERNAL MEDICINE

## 2018-02-26 PROCEDURE — X2C0361 EXTIRPATION OF MATTER FROM CORONARY ARTERY, ONE ARTERY USING ORBITAL ATHERECTOMY TECHNOLOGY, PERCUTANEOUS APPROACH, NEW TECHNOLOGY GROUP 1: ICD-10-PCS | Performed by: INTERNAL MEDICINE

## 2018-02-26 PROCEDURE — B41GYZZ FLUOROSCOPY OF LEFT LOWER EXTREMITY ARTERIES USING OTHER CONTRAST: ICD-10-PCS | Performed by: INTERNAL MEDICINE

## 2018-02-26 PROCEDURE — 5A0221D ASSISTANCE WITH CARDIAC OUTPUT USING IMPELLER PUMP, CONTINUOUS: ICD-10-PCS | Performed by: INTERNAL MEDICINE

## 2018-02-26 PROCEDURE — B41FYZZ FLUOROSCOPY OF RIGHT LOWER EXTREMITY ARTERIES USING OTHER CONTRAST: ICD-10-PCS | Performed by: INTERNAL MEDICINE

## 2018-02-26 PROCEDURE — 99232 SBSQ HOSP IP/OBS MODERATE 35: CPT | Performed by: INTERNAL MEDICINE

## 2018-02-26 PROCEDURE — 99233 SBSQ HOSP IP/OBS HIGH 50: CPT | Performed by: INTERNAL MEDICINE

## 2018-02-26 RX ORDER — DEXTROSE, SODIUM CHLORIDE, SODIUM LACTATE, POTASSIUM CHLORIDE, AND CALCIUM CHLORIDE 5; .6; .31; .03; .02 G/100ML; G/100ML; G/100ML; G/100ML; G/100ML
INJECTION, SOLUTION INTRAVENOUS CONTINUOUS
Status: DISCONTINUED | OUTPATIENT
Start: 2018-02-26 | End: 2018-02-27

## 2018-02-26 RX ORDER — HEPARIN SODIUM 1000 [USP'U]/ML
INJECTION, SOLUTION INTRAVENOUS; SUBCUTANEOUS
Status: COMPLETED
Start: 2018-02-26 | End: 2018-02-26

## 2018-02-26 RX ORDER — CLOPIDOGREL BISULFATE 75 MG/1
75 TABLET ORAL DAILY
Status: DISCONTINUED | OUTPATIENT
Start: 2018-02-27 | End: 2018-02-28

## 2018-02-26 RX ORDER — MIDAZOLAM HYDROCHLORIDE 1 MG/ML
INJECTION INTRAMUSCULAR; INTRAVENOUS
Status: COMPLETED
Start: 2018-02-26 | End: 2018-02-26

## 2018-02-26 RX ORDER — ACETAMINOPHEN 325 MG/1
650 TABLET ORAL EVERY 6 HOURS PRN
Status: DISCONTINUED | OUTPATIENT
Start: 2018-02-26 | End: 2018-02-28

## 2018-02-26 RX ORDER — SODIUM CHLORIDE 9 MG/ML
INJECTION, SOLUTION INTRAVENOUS CONTINUOUS
Status: ACTIVE | OUTPATIENT
Start: 2018-02-26 | End: 2018-02-26

## 2018-02-26 RX ORDER — HYDROCODONE BITARTRATE AND ACETAMINOPHEN 5; 325 MG/1; MG/1
1 TABLET ORAL EVERY 6 HOURS PRN
Status: DISCONTINUED | OUTPATIENT
Start: 2018-02-26 | End: 2018-02-28

## 2018-02-26 RX ORDER — PROTAMINE SULFATE 10 MG/ML
INJECTION, SOLUTION INTRAVENOUS
Status: COMPLETED
Start: 2018-02-26 | End: 2018-02-26

## 2018-02-26 RX ORDER — DOPAMINE HYDROCHLORIDE 320 MG/100ML
INJECTION, SOLUTION INTRAVENOUS
Status: COMPLETED
Start: 2018-02-26 | End: 2018-02-26

## 2018-02-26 RX ORDER — LIDOCAINE HYDROCHLORIDE 10 MG/ML
INJECTION, SOLUTION INFILTRATION; PERINEURAL
Status: COMPLETED
Start: 2018-02-26 | End: 2018-02-26

## 2018-02-26 RX ORDER — SODIUM CHLORIDE 9 MG/ML
INJECTION, SOLUTION INTRAVENOUS CONTINUOUS
Status: DISCONTINUED | OUTPATIENT
Start: 2018-02-26 | End: 2018-02-27

## 2018-02-26 RX ORDER — HEPARIN SODIUM 5000 [USP'U]/ML
INJECTION, SOLUTION INTRAVENOUS; SUBCUTANEOUS
Status: COMPLETED
Start: 2018-02-26 | End: 2018-02-26

## 2018-02-26 RX ORDER — NICARDIPINE HYDROCHLORIDE 2.5 MG/ML
INJECTION INTRAVENOUS
Status: COMPLETED
Start: 2018-02-26 | End: 2018-02-26

## 2018-02-26 NOTE — PROGRESS NOTES
[facility]  Nephrology Progress Note    [address] Patient Status:  Inpatient    1945 MRN [id number]   [facility] 2NE-A Attending Davey Mccarthy MD   [facility] Day # 7 PCP PHYSICIAN NONSTAFF       SUBJECTIVE:  No acute issues overnig flexpen 1-10 Units 1-10 Units Subcutaneous TID AC and HS   Insulin Aspart Pen (NOVOLOG) 100 UNIT/ML flexpen 1-68 Units 1-68 Units Subcutaneous TID CC           Physical Exam:   BP 97/60 (BP Location: Right arm)   Pulse 64   Temp (!) 97.4 °F (36.3 °C) (Oral needs closely. Patient aware that risk of significant TAMAR which may necessitate dialysis is high. 2. Pneumonia- cont abx    3. CAD- has severe 3v disease with plan for complex PCI once pneumonia treated.   Intervention potentially planned for later tod

## 2018-02-26 NOTE — PROGRESS NOTES
[address]  Patient Status:  Inpatient    1945 MRN [id number]   [facility] 2NE-A Attending France Beasley MD   [address] Day # 7 PCP PHYSICIAN NONSTAFF     SUBJECTIVE: No acute events overnight.   Pt denies SOB, cough, sp PRN **OR** Glucose-Vitamin C (DEX-4) 4-0.006 g chewable tab 4 tablet, 4 tablet, Oral, Q15 Min PRN **OR** dextrose 50% injection 50 mL, 50 mL, Intravenous, Q15 Min PRN **OR** glucose (DEX4) oral liquid 30 g, 30 g, Oral, Q15 Min PRN **OR** Glucose-Vitamin C from pulmonary perspective  2.  RLL pneumonia: improving  -on RA  -RVP negative  -sputum culture with no growth  -urine strep/legionella negative  -s/p 7 day course of zosyn, completed doses of azithro  -antitussives prn, cough can persist up to six weeks

## 2018-02-26 NOTE — PROGRESS NOTES
[facility] Cardiology Progress Note - [address] Patient Status:  Inpatient    1945 MRN [id number]   [facility] 2NE-A Attending Ling Bell MD   [address] Day # 7 PCP PHYSICIAN NONSTAFF     Subjective:  Patient is distress. HEENT: Normocephalic, anicteric sclera, neck supple, no thyromegaly or adenopathy. Neck: No JVD, carotids 2+, no bruits. Cardiac: Regular rate and rhythm. S1, S2 normal. No murmur, pericardial rub, S3, thrill, heave or extra cardiac sounds.   L chewable tab 81 mg 81 mg Oral Daily   gabapentin (NEURONTIN) cap 300 mg 300 mg Oral BID   Isosorbide Mononitrate ER (IMDUR) 24 hr tab 30 mg 30 mg Oral Daily   metoprolol succinate (Toprol XL) partial tablet 12.5 mg 12.5 mg Oral Nightly   Heparin Sodium (Po

## 2018-02-26 NOTE — PROGRESS NOTES
SITTING ON BEDSIDE CHAIR. ALERT AND OX3. NO SOB ROOM AIR. TELE A. PACED.BLOOD SUGAR 62. DENIES C/O WEAKNESS OR DIZZINESS. NPO FOR SURGERY THIS AFTERNOON. NOTIFIED MD WITH ORDER. STARTED D5LR AT 50 CC/HR. K-LEVEL 3.4 K CLIFFORD STARTED AS ORDER. WILL CONTINUE TO MONIT

## 2018-02-26 NOTE — PLAN OF CARE
Alert. Oriented. sr per tele. cpap on tonight- compliant. o2 sat 99%. Wife at bedside. Denies pain, sob. Redness noted on braxton feet- no rash noted. Afebrile. ivf started as ordered. Consent signed of PCI w/ Niaella for today.  Repeat cxr done last night show

## 2018-02-26 NOTE — PROGRESS NOTES
Cards    Impella supported PCI to LAD (after CSI atherectomy), OM, and CX.  3 stents placed (all KAELYN). Good results. Perclose of left failed. Used balloon assisted manual hemostasis. DSA picture at the end with excellent hemostasis and distal flow.

## 2018-02-26 NOTE — PROGRESS NOTES
[facility]  Progress Note    [address] Patient Status:  Inpatient    1945 MRN [id number]   [facility] 2NE-A Attending France Beasley MD   Hosp Day # 7 PCP PHYSICIAN NONSTAFF     CC: Shortness of breath, generalized weakness, fa Intravenous Once   ipratropium-albuterol (DUONEB) nebulizer solution 3 mL 3 mL Nebulization Q6H PRN   0.9%  NaCl infusion  Intravenous Continuous   Rosuvastatin Calcium (CRESTOR) 20 MG tab 20 mg 20 mg Oral Nightly   PEG 3350 (MIRALAX) powder packet 17 g 17 chronic chronic systolic heart failure– improving with diuretics.    EF of 30-35% with moderate to severe diffuse hypokinesis, again this is of inferoseptal myocardium, hypokinesis of lateral myocardium, hypokinesis of inferolateral myocardium, dyskinesis o above.    1. Pneumonia– possible gram-negative pneumonia– completed IV antibiotics for 7 days with IV Zosyn, also received 3 days of IV azithromycin.  Pneumonia improved.    Seen by pulmonary on consult for pulmonary clearance for cardiac intervention  2.

## 2018-02-27 LAB
ATRIAL RATE: 66 BPM
BILIRUB UR QL STRIP.AUTO: NEGATIVE
BUN BLD-MCNC: 46 MG/DL (ref 8–20)
CALCIUM BLD-MCNC: 9.1 MG/DL (ref 8.3–10.3)
CHLORIDE: 107 MMOL/L (ref 101–111)
CO2: 21 MMOL/L (ref 22–32)
COLOR UR AUTO: YELLOW
CREAT BLD-MCNC: 3.24 MG/DL (ref 0.7–1.3)
ERYTHROCYTE [DISTWIDTH] IN BLOOD BY AUTOMATED COUNT: 15.3 % (ref 11.5–16)
GLUCOSE BLD-MCNC: 134 MG/DL (ref 65–99)
GLUCOSE BLD-MCNC: 138 MG/DL (ref 65–99)
GLUCOSE BLD-MCNC: 167 MG/DL (ref 65–99)
GLUCOSE BLD-MCNC: 232 MG/DL (ref 65–99)
GLUCOSE BLD-MCNC: 77 MG/DL (ref 65–99)
GLUCOSE BLD-MCNC: 77 MG/DL (ref 65–99)
GLUCOSE BLD-MCNC: 79 MG/DL (ref 70–99)
GLUCOSE UR STRIP.AUTO-MCNC: 50 MG/DL
HAV IGM SER QL: 2.2 MG/DL (ref 1.7–3)
HCT VFR BLD AUTO: 35.4 % (ref 37–53)
HGB BLD-MCNC: 11.3 G/DL (ref 13–17)
MCH RBC QN AUTO: 31.8 PG (ref 27–33.2)
MCHC RBC AUTO-ENTMCNC: 31.9 G/DL (ref 31–37)
MCV RBC AUTO: 99.7 FL (ref 80–99)
NITRITE UR QL STRIP.AUTO: NEGATIVE
P AXIS: 24 DEGREES
P-R INTERVAL: 156 MS
PH UR STRIP.AUTO: 5 [PH] (ref 4.5–8)
PLATELET # BLD AUTO: 234 10(3)UL (ref 150–450)
POTASSIUM SERPL-SCNC: 4.1 MMOL/L (ref 3.6–5.1)
PROT UR STRIP.AUTO-MCNC: 30 MG/DL
Q-T INTERVAL: 438 MS
QRS DURATION: 92 MS
QTC CALCULATION (BEZET): 459 MS
R AXIS: -2 DEGREES
RBC # BLD AUTO: 3.55 X10(6)UL (ref 3.8–5.8)
RBC #/AREA URNS AUTO: >10 /HPF
RED CELL DISTRIBUTION WIDTH-SD: 56.3 FL (ref 35.1–46.3)
SODIUM SERPL-SCNC: 139 MMOL/L (ref 136–144)
SP GR UR STRIP.AUTO: 1.02 (ref 1–1.03)
T AXIS: -56 DEGREES
UROBILINOGEN UR STRIP.AUTO-MCNC: <2 MG/DL
VENTRICULAR RATE: 66 BPM
WBC # BLD AUTO: 7.5 X10(3) UL (ref 4–13)

## 2018-02-27 PROCEDURE — 99233 SBSQ HOSP IP/OBS HIGH 50: CPT | Performed by: INTERNAL MEDICINE

## 2018-02-27 PROCEDURE — 99232 SBSQ HOSP IP/OBS MODERATE 35: CPT | Performed by: INTERNAL MEDICINE

## 2018-02-27 RX ORDER — ALFUZOSIN HYDROCHLORIDE 10 MG/1
10 TABLET, EXTENDED RELEASE ORAL
Status: DISCONTINUED | OUTPATIENT
Start: 2018-02-27 | End: 2018-02-28

## 2018-02-27 NOTE — PROGRESS NOTES
NURSING ADMISSION NOTE      Patient admitted via Wheelchair  Oriented to room. Safety precautions initiated. Bed in low position. Call light in reach. Pt alert and oriented, from CCU this morning, family at bedside. VSS. Denies any pain.  Bilate

## 2018-02-27 NOTE — PROGRESS NOTES
[facility]  Nephrology Progress Note    [address] Patient Status:  Inpatient    1945 MRN [id number]   [facility] 2NE-A Attending Krys Mcnulty MD   [facility] Day # 8 PCP PHYSICIAN NONSTAFF       SUBJECTIVE:  S/p complex (impella as g Oral Q15 Min PRN   Or      Glucose-Vitamin C (DEX-4) 4-0.006 g chewable tab 8 tablet 8 tablet Oral Q15 Min PRN   insulin detemir (LEVEMIR) 100 UNIT/ML flextouch 15 Units 15 Units Subcutaneous Maxx@[employer]   Insulin Aspart Pen (NOVOLOG) 100 UNIT/ML flexp labs    2. Pneumonia-  finished abx    3. CAD- has severe 3v disease with plan for complex PCI once pneumonia treated. - s/p PCI; per cardiology    4. HTN- stable; monitor      Questions/concerns were discussed with patient and/or family by bedside.

## 2018-02-27 NOTE — PROGRESS NOTES
[facility]  Progress Note    [address] Patient Status:  Inpatient    1945 MRN [id number]   [facility] 2NE-A Attending Lauren Sharpe MD   Hosp Day # 8 PCP PHYSICIAN NONSTAFF     CC: Shortness of breath, generalized weakness, fa Epic.    Meds:     Current Facility-Administered Medications:  Alfuzosin HCl ER (UROXATRAL) 24 hr tab 10 mg 10 mg Oral Daily with breakfast   iodixanol (VISIPAQUE) 320 MG/ML injection 150 mL 150 mL Injection ONCE PRN   Clopidogrel Bisulfate (PLAVIX) tab 75 TID CC       ASSESSMENT / PLAN:     1. Pneumonia–  resolving   1. Completed abx  2. pulm  Signed off   3. Cough can last several weeks      2. CAD-  S/p  PCI x3 arteries  w/ KAELYN's   w/ Impella -  1. Cards following  2. Tx out of CCU  3.  ASA/ plavix  4. imd auscultation bilaterally  Heart: S1, S2 normal, no murmur,  regular rate and rhythm  Abdomen: soft, non-tender; bowel sounds normal  Extremities: extremities normal,no cyanosis or edema  Pulses: 2+ and symmetric  Skin: b/l groin cath site with no ecchymosi

## 2018-02-27 NOTE — CARDIAC REHAB
CAD and HF education initiated with patient and wife. Patient falling asleep. Will revisit. Gave stent cards to patient and wife.

## 2018-02-27 NOTE — PLAN OF CARE
Assumed care at [facility], patient resting supine in bed, A/Ox4, SAUL, VSS, NSR per tele for full assessment see charting. Bed adjusted to 30 degrees at 2300 per orders.  Right groin puncture site continually saturated in sanguinous drainage, gauze and tegaderm d

## 2018-02-27 NOTE — DIETARY NOTE
Nutrition Short Note    Dietitian consult received per cardiac rehab protocol. Pt to be educated by cardiac rehab staff and encouraged to attend outpatient classes taught by RD. RD available PRN.     Orestes Gomez, LADY, LDN

## 2018-02-27 NOTE — PLAN OF CARE
A/ox4, flat affect. On RA, BP stable. Moderate pain in bilateral groin, groin site and left and right groin soft; no hematoma. Drsg to groin site bother clean/dry/intact. Urinary retention issue overnight.  Unable to void early AM. Started on Uroxatra

## 2018-02-27 NOTE — PROCEDURES
[facility]    PATIENT'S NAME: Siddharth Brewer   ATTENDING PHYSICIAN: Scott Banks M.D. OPERATING PHYSICIAN: Val Curtis.  Bernard Davis MD   PATIENT ACCOUNT#:   [id number]    LOCATION:  [address] [place] [address]  MEDICAL RECORD #:   [id number]       DATE OF BIRTH:  [birth year] proximal vessel. We made multiple passes at low and high speed. We then re-exchanged for the [employer] wire and ballooned with a 3.0 mm noncompliant balloon. We then stented with a 3.0 x 30 mm Resolute Howard drug-eluting stent at 14 atmospheres.   We postdilate fashion. We had no hemodynamic compromise when weaning and removing the Impella. We then used our Perclose sutures for hemostasis. We, unfortunately, had considerable bleeding. We reinserted the sheath.   We went from the other groin and used a Crossove

## 2018-02-27 NOTE — PROGRESS NOTES
[facility] Cardiology Progress Note - [address] Patient Status:  Inpatient    1945 MRN [id number]   [facility] 8NE-A Attending Alesia Nicole MD   [facility] Day # 8 PCP PHYSICIAN NONSTAFF     Subjective:   The patient 9994 : 252 lb 6.8 oz (114.5 kg)  02/22/18 0431 : 252 lb 10.4 oz (114.6 kg)  02/21/18 0448 : 249 lb 1.9 oz (113 kg)  02/20/18 0449 : 248 lb 7.3 oz (112.7 kg)  02/19/18 0258 : 250 lb 7.1 oz (113.6 kg)  02/18/18 2324 : 250 lb (113.4 kg)  01/30/18 1402 : 247 l mL 150 mL Injection ONCE PRN   Clopidogrel Bisulfate (PLAVIX) tab 75 mg 75 mg Oral Daily   acetaminophen (TYLENOL) tab 650 mg 650 mg Oral Q6H PRN   HYDROcodone-acetaminophen (NORCO) 5-325 MG per tab 1 tablet 1 tablet Oral Q6H PRN   ipratropium-albuterol (D

## 2018-02-28 VITALS
HEIGHT: 67 IN | SYSTOLIC BLOOD PRESSURE: 123 MMHG | DIASTOLIC BLOOD PRESSURE: 75 MMHG | BODY MASS INDEX: 39.85 KG/M2 | WEIGHT: 253.88 LBS | OXYGEN SATURATION: 98 % | HEART RATE: 79 BPM | TEMPERATURE: 98 F | RESPIRATION RATE: 16 BRPM

## 2018-02-28 LAB
BUN BLD-MCNC: 44 MG/DL (ref 8–20)
CALCIUM BLD-MCNC: 8.8 MG/DL (ref 8.3–10.3)
CHLORIDE: 106 MMOL/L (ref 101–111)
CO2: 23 MMOL/L (ref 22–32)
CREAT BLD-MCNC: 3.13 MG/DL (ref 0.7–1.3)
GLUCOSE BLD-MCNC: 153 MG/DL (ref 65–99)
GLUCOSE BLD-MCNC: 167 MG/DL (ref 70–99)
GLUCOSE BLD-MCNC: 180 MG/DL (ref 65–99)
HAV IGM SER QL: 2.1 MG/DL (ref 1.7–3)
POTASSIUM SERPL-SCNC: 3.9 MMOL/L (ref 3.6–5.1)
SODIUM SERPL-SCNC: 138 MMOL/L (ref 136–144)

## 2018-02-28 PROCEDURE — 99232 SBSQ HOSP IP/OBS MODERATE 35: CPT | Performed by: HOSPITALIST

## 2018-02-28 RX ORDER — ROSUVASTATIN CALCIUM 20 MG/1
20 TABLET, COATED ORAL NIGHTLY
Qty: 90 TABLET | Refills: 1 | Status: SHIPPED | OUTPATIENT
Start: 2018-02-28 | End: 2018-08-11

## 2018-02-28 RX ORDER — CLOPIDOGREL BISULFATE 75 MG/1
75 TABLET ORAL DAILY
Qty: 90 TABLET | Refills: 3 | Status: SHIPPED | OUTPATIENT
Start: 2018-02-28 | End: 2018-08-11

## 2018-02-28 RX ORDER — FUROSEMIDE 40 MG/1
40 TABLET ORAL DAILY
Qty: 30 TABLET | Refills: 0 | Status: SHIPPED | OUTPATIENT
Start: 2018-02-28 | End: 2018-03-22

## 2018-02-28 RX ORDER — ALFUZOSIN HYDROCHLORIDE 10 MG/1
10 TABLET, EXTENDED RELEASE ORAL
Qty: 30 TABLET | Refills: 0 | Status: SHIPPED | OUTPATIENT
Start: 2018-03-01 | End: 2018-03-22

## 2018-02-28 NOTE — PROGRESS NOTES
[facility]  Progress Note    [address] Patient Status:  Inpatient    1945 MRN [id number]   [facility] 2NE-A Attending Liban Reese MD   Hosp Day # 9 PCP PHYSICIAN NONSTAFF     CC: Shortness of breath, generalized weakness, f CC   Alfuzosin HCl ER (UROXATRAL) 24 hr tab 10 mg 10 mg Oral Daily with breakfast   insulin detemir (LEVEMIR) 100 UNIT/ML flextouch 12 Units 12 Units Subcutaneous [email or web address]   iodixanol (VISIPAQUE) 320 MG/ML injection 150 mL 150 mL Injection ONCE PRN arteries  w/ KAELYN's   w/ Impella -  1. Cards following  2. ASA/ plavix  3. imdur , BB  Statin     4. No ACE due to renal disease     3. Diarrhea–stool C. difficile negative. Resolved  Off abx   4. Urinary retention/ burning--  1.  uroxatral   2.  UA w/ refl

## 2018-02-28 NOTE — PROGRESS NOTES
[facility]  Nephrology Progress Note    [address] Patient Status:  Inpatient    1945 MRN [id number]   [facility] 2NE-A Attending Desmond Hdz MD   [facility] Day # 9 PCP PHYSICIAN NONSTAFF       SUBJECTIVE:  Doing great  NO complai 50 mL 50 mL Intravenous Q15 Min PRN   Or      glucose (DEX4) oral liquid 30 g 30 g Oral Q15 Min PRN   Or      Glucose-Vitamin C (DEX-4) 4-0.006 g chewable tab 8 tablet 8 tablet Oral Q15 Min PRN   Insulin Aspart Pen (NOVOLOG) 100 UNIT/ML flexpen 1-68 Units f/u urine culture    2. Pneumonia-  finished abx    3. CAD- has severe 3v disease with plan for complex PCI once pneumonia treated. - s/p PCI; per cardiology    4.   HTN- stable; monitor    D/C planning    Questions/concerns were discussed with patient an

## 2018-02-28 NOTE — PROGRESS NOTES
[address] [facility] Cardiology  Progress Note    [address] Patient Status:  Inpatient    1945 MRN [id number]   [facility] 8NE-A Attending Justin Barron MD   Hosp Day # 9 PCP PHYSICIAN NONSTAFF     Impression:  1. ischemic cardiom flexpen 1-10 Units 1-10 Units Subcutaneous TID CC   Alfuzosin HCl ER (UROXATRAL) 24 hr tab 10 mg 10 mg Oral Daily with breakfast   insulin detemir (LEVEMIR) 100 UNIT/ML flextouch 12 Units 12 Units Subcutaneous [email or web address]   iodixanol (VISIPAQUE) 320 MG/M 02/28/2018   K 3.9 02/28/2018    02/28/2018   CO2 23.0 02/28/2018   BUN 44 02/28/2018   CREATSERUM 3.13 02/28/2018    02/28/2018   CA 8.8 02/28/2018   MG 2.1 02/28/2018       Telemetry: No malignant tachyarrhythmias or bradyarrhythmias      Th

## 2018-02-28 NOTE — CM/SW NOTE
02/28/18 1600   Discharge disposition   Discharged to: Home-Health   Name of Facillity/Home Care/Hospice ([address])   Discharge transportation Private car   SW notified Prabhu Fiore with [address] of d/c and sent the avs.

## 2018-02-28 NOTE — PLAN OF CARE
Received patient around 0730 alert and oriented  No complaints of pain  NSR on monitor  Lungs diminished bilaterally  Ambulates with stand by assist  Will continue to monitor.     1600 - patient discharged home  IV and monitor removed  Reviewed discharge in

## 2018-03-01 NOTE — DISCHARGE SUMMARY
[facility] [place] HOSPITALIST  DISCHARGE SUMMARY     [address] Patient Status:  Inpatient    1945 MRN [id number]   [facility] 8NE-A Attending No att. providers found   Hosp Day # 9 PCP PHYSICIAN NONSTAFF     Date of Admission: 2018  Date showed diffuse diabetic 3 vessel coronary disease and echo that showed LV dysfunction the cardiologist and nephrologist recommended bypass surgery that was refused by the vascular surgeon.   Patient had a defibrillator and pacer placed and he was discharged cassidy has had close monitoring of his kidney status has been followed by nephrology cardiology. Meds have been adjusted.   He has improved decision was made after review of films with cardiology CV surgery to plan complex PCI with use of Impella device artery. 10.        Iliac and femoral angiography. • 11. Balloon-assisted left femoral hemostasis.     Incidental or significant findings and recommendations (brief descriptions):  • Follow-up with cardiology, nephrology and PCP  · Avoid NSAIDs  · C Tb24  Commonly known as:  IMDUR      Take 30 mg by mouth daily. Refills:  0     Metoprolol Succinate ER 25 MG Tb24  Commonly known as: Toprol XL      Take 12.5 mg by mouth nightly.    Refills:  0     RA VITAMIN B-12 TR 1000 MCG Tbcr  Generic drug:  Cyano normal  -----------------------------------------------------------------------------------------------  PATIENT DISCHARGE INSTRUCTIONS: See electronic chart    Sonya Hinson NP, JAMES  2/28/2018    Time spent:  > 30 minutes

## 2018-03-04 PROBLEM — N18.4 TYPE 2 DIABETES MELLITUS WITH STAGE 4 CHRONIC KIDNEY DISEASE, WITH LONG-TERM CURRENT USE OF INSULIN (HCC): Status: ACTIVE | Noted: 2018-03-04

## 2018-03-04 PROBLEM — E11.22 TYPE 2 DIABETES MELLITUS WITH STAGE 4 CHRONIC KIDNEY DISEASE, WITH LONG-TERM CURRENT USE OF INSULIN (HCC): Status: ACTIVE | Noted: 2018-03-04

## 2018-03-04 PROBLEM — N18.30 STAGE 3 CHRONIC KIDNEY DISEASE (HCC): Status: RESOLVED | Noted: 2018-01-30 | Resolved: 2018-03-04

## 2018-03-04 PROBLEM — Z79.4 TYPE 2 DIABETES MELLITUS WITH STAGE 4 CHRONIC KIDNEY DISEASE, WITH LONG-TERM CURRENT USE OF INSULIN (HCC): Status: ACTIVE | Noted: 2018-03-04

## 2018-03-09 PROBLEM — R73.9 HYPERGLYCEMIA: Status: RESOLVED | Noted: 2018-02-19 | Resolved: 2018-03-09

## 2018-03-12 ENCOUNTER — OFFICE VISIT (OUTPATIENT)
Dept: NEPHROLOGY | Facility: CLINIC | Age: 73
End: 2018-03-12

## 2018-03-12 VITALS — WEIGHT: 246 LBS | SYSTOLIC BLOOD PRESSURE: 122 MMHG | DIASTOLIC BLOOD PRESSURE: 70 MMHG | BODY MASS INDEX: 39 KG/M2

## 2018-03-12 DIAGNOSIS — N18.4 CKD (CHRONIC KIDNEY DISEASE) STAGE 4, GFR 15-29 ML/MIN (HCC): Primary | ICD-10-CM

## 2018-03-12 DIAGNOSIS — I10 ESSENTIAL HYPERTENSION: ICD-10-CM

## 2018-03-12 PROBLEM — E66.01 SEVERE OBESITY (BMI 35.0-39.9) WITH COMORBIDITY (HCC): Chronic | Status: ACTIVE | Noted: 2018-03-12

## 2018-03-12 PROCEDURE — 99214 OFFICE O/P EST MOD 30 MIN: CPT | Performed by: INTERNAL MEDICINE

## 2018-03-12 PROCEDURE — 1111F DSCHRG MED/CURRENT MED MERGE: CPT | Performed by: INTERNAL MEDICINE

## 2018-03-12 NOTE — PROGRESS NOTES
Nephrology Progress Note      [address] is a 81year old male. HPI:   Patient presents with:  Chronic Kidney Disease  Hypertension    Mr. Carvajal 29 was seen in the nephrology clinic today in follow-up for management of chronic kidney disease stage IV sec Never Used                      Alcohol use: No                 Medications (Active prior to today's visit):    Current Outpatient Prescriptions:  vitamin E 400 UNITS Oral Cap Take 400 Units by mouth daily.    Disp:  Rfl:    Insulin NPH & Regular 70/30 (70- lb      General: Alert and oriented in no apparent distress. HEENT: No scleral icterus, MMM  Neck: Supple, no THERESE or thyromegaly  Cardiac: Regular rate and rhythm, S1, S2 normal, no murmur or rub  Lungs: Clear without wheezes, rales, rhonchi.     Abdomen: kidney disease stage IV-this has been long-standing and due to multiple factors including diabetic nephropathy and hypertensive nephrosclerosis.   His previous creatinine levels were closer to 3.5-4.0 mg/dL or so, however since his percutaneous coronary int

## 2018-03-22 RX ORDER — ALFUZOSIN HYDROCHLORIDE 10 MG/1
10 TABLET, EXTENDED RELEASE ORAL
Qty: 30 TABLET | Refills: 0 | Status: CANCELLED
Start: 2018-03-22

## 2018-03-22 RX ORDER — FUROSEMIDE 40 MG/1
40 TABLET ORAL DAILY
Qty: 30 TABLET | Refills: 0 | Status: CANCELLED
Start: 2018-03-22

## 2018-03-22 NOTE — TELEPHONE ENCOUNTER
From: Jorge Christensen  Sent: 3/22/2018 3:02 PM CDT  Subject: Medication Renewal Request    Munira Kaur.  [facility] would like a refill of the following medications:     Alfuzosin HCl ER 10 MG Oral Tablet 24 Hr Pineda Dumont MD]     furosemide 40 MG Oral Tab Pineda Dumont MD]    Preferred pharmacy: [place] [id number] [address], [phone], [phone]

## 2018-03-23 RX ORDER — FUROSEMIDE 40 MG/1
40 TABLET ORAL DAILY
Qty: 30 TABLET | Refills: 5 | Status: SHIPPED | OUTPATIENT
Start: 2018-03-23 | End: 2018-08-13

## 2018-03-23 RX ORDER — ALFUZOSIN HYDROCHLORIDE 10 MG/1
10 TABLET, EXTENDED RELEASE ORAL
Qty: 30 TABLET | Refills: 5 | Status: SHIPPED | OUTPATIENT
Start: 2018-03-23 | End: 2018-08-13

## 2018-04-01 PROBLEM — E66.01 SEVERE OBESITY (BMI 35.0-39.9) WITH COMORBIDITY (HCC): Chronic | Status: RESOLVED | Noted: 2018-03-12 | Resolved: 2018-04-01

## 2018-04-05 ENCOUNTER — CARDPULM VISIT (OUTPATIENT)
Dept: CARDIAC REHAB | Facility: HOSPITAL | Age: 73
End: 2018-04-05
Attending: INTERNAL MEDICINE
Payer: MEDICARE

## 2018-04-05 VITALS
SYSTOLIC BLOOD PRESSURE: 118 MMHG | HEIGHT: 67 IN | BODY MASS INDEX: 37.98 KG/M2 | HEART RATE: 67 BPM | OXYGEN SATURATION: 98 % | DIASTOLIC BLOOD PRESSURE: 70 MMHG | WEIGHT: 242 LBS

## 2018-04-05 PROCEDURE — 93798 PHYS/QHP OP CAR RHAB W/ECG: CPT

## 2018-04-06 ENCOUNTER — CARDPULM VISIT (OUTPATIENT)
Dept: CARDIAC REHAB | Facility: HOSPITAL | Age: 73
End: 2018-04-06
Attending: INTERNAL MEDICINE
Payer: MEDICARE

## 2018-04-06 PROCEDURE — 93798 PHYS/QHP OP CAR RHAB W/ECG: CPT

## 2018-04-09 ENCOUNTER — CARDPULM VISIT (OUTPATIENT)
Dept: CARDIAC REHAB | Facility: HOSPITAL | Age: 73
End: 2018-04-09
Attending: INTERNAL MEDICINE
Payer: MEDICARE

## 2018-04-09 PROCEDURE — 93798 PHYS/QHP OP CAR RHAB W/ECG: CPT

## 2018-04-10 PROBLEM — Z95.810 AICD (AUTOMATIC CARDIOVERTER/DEFIBRILLATOR) PRESENT: Status: ACTIVE | Noted: 2018-04-10

## 2018-04-11 ENCOUNTER — CARDPULM VISIT (OUTPATIENT)
Dept: CARDIAC REHAB | Facility: HOSPITAL | Age: 73
End: 2018-04-11
Attending: INTERNAL MEDICINE
Payer: MEDICARE

## 2018-04-11 PROCEDURE — 93798 PHYS/QHP OP CAR RHAB W/ECG: CPT

## 2018-04-12 PROBLEM — I77.819 DILATATION OF AORTA (HCC): Status: ACTIVE | Noted: 2018-04-12

## 2018-04-13 ENCOUNTER — CARDPULM VISIT (OUTPATIENT)
Dept: CARDIAC REHAB | Facility: HOSPITAL | Age: 73
End: 2018-04-13
Attending: INTERNAL MEDICINE
Payer: MEDICARE

## 2018-04-13 PROCEDURE — 93798 PHYS/QHP OP CAR RHAB W/ECG: CPT

## 2018-04-16 ENCOUNTER — CARDPULM VISIT (OUTPATIENT)
Dept: CARDIAC REHAB | Facility: HOSPITAL | Age: 73
End: 2018-04-16
Attending: INTERNAL MEDICINE
Payer: MEDICARE

## 2018-04-16 PROCEDURE — 93798 PHYS/QHP OP CAR RHAB W/ECG: CPT

## 2018-04-18 ENCOUNTER — CARDPULM VISIT (OUTPATIENT)
Dept: CARDIAC REHAB | Facility: HOSPITAL | Age: 73
End: 2018-04-18
Attending: INTERNAL MEDICINE
Payer: MEDICARE

## 2018-04-18 PROCEDURE — 93798 PHYS/QHP OP CAR RHAB W/ECG: CPT

## 2018-04-20 ENCOUNTER — CARDPULM VISIT (OUTPATIENT)
Dept: CARDIAC REHAB | Facility: HOSPITAL | Age: 73
End: 2018-04-20
Attending: INTERNAL MEDICINE
Payer: MEDICARE

## 2018-04-20 PROCEDURE — 93798 PHYS/QHP OP CAR RHAB W/ECG: CPT

## 2018-04-25 ENCOUNTER — CARDPULM VISIT (OUTPATIENT)
Dept: CARDIAC REHAB | Facility: HOSPITAL | Age: 73
End: 2018-04-25
Attending: INTERNAL MEDICINE
Payer: MEDICARE

## 2018-04-25 PROCEDURE — 93798 PHYS/QHP OP CAR RHAB W/ECG: CPT

## 2018-04-26 PROBLEM — E87.1 HYPONATREMIA: Status: RESOLVED | Noted: 2018-02-19 | Resolved: 2018-04-26

## 2018-04-26 PROBLEM — N18.9 CHRONIC RENAL FAILURE, UNSPECIFIED CKD STAGE: Status: RESOLVED | Noted: 2018-02-19 | Resolved: 2018-04-26
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
## 2018-04-26 PROBLEM — E11.42 DIABETIC POLYNEUROPATHY ASSOCIATED WITH TYPE 2 DIABETES MELLITUS (HCC): Status: ACTIVE | Noted: 2018-04-26

## 2018-04-26 PROBLEM — J18.9 NOSOCOMIAL PNEUMONIA: Status: RESOLVED | Noted: 2018-02-19 | Resolved: 2018-04-26

## 2018-04-27 ENCOUNTER — CARDPULM VISIT (OUTPATIENT)
Dept: CARDIAC REHAB | Facility: HOSPITAL | Age: 73
End: 2018-04-27
Attending: INTERNAL MEDICINE
Payer: MEDICARE

## 2018-04-27 PROCEDURE — 93798 PHYS/QHP OP CAR RHAB W/ECG: CPT

## 2018-04-30 ENCOUNTER — CARDPULM VISIT (OUTPATIENT)
Dept: CARDIAC REHAB | Facility: HOSPITAL | Age: 73
End: 2018-04-30
Attending: INTERNAL MEDICINE
Payer: MEDICARE

## 2018-04-30 PROCEDURE — 93798 PHYS/QHP OP CAR RHAB W/ECG: CPT

## 2018-05-02 ENCOUNTER — CARDPULM VISIT (OUTPATIENT)
Dept: CARDIAC REHAB | Facility: HOSPITAL | Age: 73
End: 2018-05-02
Attending: INTERNAL MEDICINE
Payer: MEDICARE

## 2018-05-02 PROCEDURE — 93798 PHYS/QHP OP CAR RHAB W/ECG: CPT

## 2018-05-04 ENCOUNTER — CARDPULM VISIT (OUTPATIENT)
Dept: CARDIAC REHAB | Facility: HOSPITAL | Age: 73
End: 2018-05-04
Attending: INTERNAL MEDICINE
Payer: MEDICARE

## 2018-05-04 PROCEDURE — 93798 PHYS/QHP OP CAR RHAB W/ECG: CPT

## 2018-05-07 ENCOUNTER — CARDPULM VISIT (OUTPATIENT)
Dept: CARDIAC REHAB | Facility: HOSPITAL | Age: 73
End: 2018-05-07
Attending: INTERNAL MEDICINE
Payer: MEDICARE

## 2018-05-07 PROCEDURE — 93798 PHYS/QHP OP CAR RHAB W/ECG: CPT

## 2018-05-09 ENCOUNTER — CARDPULM VISIT (OUTPATIENT)
Dept: CARDIAC REHAB | Facility: HOSPITAL | Age: 73
End: 2018-05-09
Attending: INTERNAL MEDICINE
Payer: MEDICARE

## 2018-05-09 PROCEDURE — 93798 PHYS/QHP OP CAR RHAB W/ECG: CPT

## 2018-05-11 ENCOUNTER — CARDPULM VISIT (OUTPATIENT)
Dept: CARDIAC REHAB | Facility: HOSPITAL | Age: 73
End: 2018-05-11
Attending: INTERNAL MEDICINE
Payer: MEDICARE

## 2018-05-11 PROCEDURE — 93798 PHYS/QHP OP CAR RHAB W/ECG: CPT

## 2018-05-14 ENCOUNTER — APPOINTMENT (OUTPATIENT)
Dept: CARDIAC REHAB | Facility: HOSPITAL | Age: 73
End: 2018-05-14
Attending: INTERNAL MEDICINE
Payer: MEDICARE

## 2018-05-16 ENCOUNTER — APPOINTMENT (OUTPATIENT)
Dept: CARDIAC REHAB | Facility: HOSPITAL | Age: 73
End: 2018-05-16
Attending: INTERNAL MEDICINE
Payer: MEDICARE

## 2018-05-18 ENCOUNTER — APPOINTMENT (OUTPATIENT)
Dept: CARDIAC REHAB | Facility: HOSPITAL | Age: 73
End: 2018-05-18
Attending: INTERNAL MEDICINE
Payer: MEDICARE

## 2018-05-21 ENCOUNTER — APPOINTMENT (OUTPATIENT)
Dept: CARDIAC REHAB | Facility: HOSPITAL | Age: 73
End: 2018-05-21
Attending: INTERNAL MEDICINE
Payer: MEDICARE

## 2018-05-23 ENCOUNTER — APPOINTMENT (OUTPATIENT)
Dept: CARDIAC REHAB | Facility: HOSPITAL | Age: 73
End: 2018-05-23
Attending: INTERNAL MEDICINE
Payer: MEDICARE

## 2018-05-23 PROBLEM — I25.5 ISCHEMIC CARDIOMYOPATHY: Status: ACTIVE | Noted: 2018-05-23

## 2018-05-25 ENCOUNTER — APPOINTMENT (OUTPATIENT)
Dept: CARDIAC REHAB | Facility: HOSPITAL | Age: 73
End: 2018-05-25
Attending: INTERNAL MEDICINE
Payer: MEDICARE

## 2018-05-28 ENCOUNTER — APPOINTMENT (OUTPATIENT)
Dept: CARDIAC REHAB | Facility: HOSPITAL | Age: 73
End: 2018-05-28
Attending: INTERNAL MEDICINE
Payer: MEDICARE

## 2018-05-30 ENCOUNTER — APPOINTMENT (OUTPATIENT)
Dept: CARDIAC REHAB | Facility: HOSPITAL | Age: 73
End: 2018-05-30
Attending: INTERNAL MEDICINE
Payer: MEDICARE

## 2018-06-01 ENCOUNTER — APPOINTMENT (OUTPATIENT)
Dept: CARDIAC REHAB | Facility: HOSPITAL | Age: 73
End: 2018-06-01
Attending: INTERNAL MEDICINE
Payer: MEDICARE

## 2018-06-04 ENCOUNTER — APPOINTMENT (OUTPATIENT)
Dept: CARDIAC REHAB | Facility: HOSPITAL | Age: 73
End: 2018-06-04
Attending: INTERNAL MEDICINE
Payer: MEDICARE

## 2018-06-06 ENCOUNTER — APPOINTMENT (OUTPATIENT)
Dept: CARDIAC REHAB | Facility: HOSPITAL | Age: 73
End: 2018-06-06
Attending: INTERNAL MEDICINE
Payer: MEDICARE

## 2018-06-08 ENCOUNTER — APPOINTMENT (OUTPATIENT)
Dept: CARDIAC REHAB | Facility: HOSPITAL | Age: 73
End: 2018-06-08
Attending: INTERNAL MEDICINE
Payer: MEDICARE

## 2018-06-11 ENCOUNTER — APPOINTMENT (OUTPATIENT)
Dept: CARDIAC REHAB | Facility: HOSPITAL | Age: 73
End: 2018-06-11
Attending: INTERNAL MEDICINE
Payer: MEDICARE

## 2018-06-13 ENCOUNTER — APPOINTMENT (OUTPATIENT)
Dept: CARDIAC REHAB | Facility: HOSPITAL | Age: 73
End: 2018-06-13
Attending: INTERNAL MEDICINE
Payer: MEDICARE

## 2018-06-15 ENCOUNTER — APPOINTMENT (OUTPATIENT)
Dept: CARDIAC REHAB | Facility: HOSPITAL | Age: 73
End: 2018-06-15
Attending: INTERNAL MEDICINE
Payer: MEDICARE

## 2018-06-18 ENCOUNTER — APPOINTMENT (OUTPATIENT)
Dept: CARDIAC REHAB | Facility: HOSPITAL | Age: 73
End: 2018-06-18
Attending: INTERNAL MEDICINE
Payer: MEDICARE

## 2018-06-20 ENCOUNTER — APPOINTMENT (OUTPATIENT)
Dept: CARDIAC REHAB | Facility: HOSPITAL | Age: 73
End: 2018-06-20
Attending: INTERNAL MEDICINE
Payer: MEDICARE

## 2018-06-22 ENCOUNTER — APPOINTMENT (OUTPATIENT)
Dept: CARDIAC REHAB | Facility: HOSPITAL | Age: 73
End: 2018-06-22
Attending: INTERNAL MEDICINE
Payer: MEDICARE

## 2018-06-25 ENCOUNTER — APPOINTMENT (OUTPATIENT)
Dept: CARDIAC REHAB | Facility: HOSPITAL | Age: 73
End: 2018-06-25
Attending: INTERNAL MEDICINE
Payer: MEDICARE

## 2018-06-27 ENCOUNTER — APPOINTMENT (OUTPATIENT)
Dept: CARDIAC REHAB | Facility: HOSPITAL | Age: 73
End: 2018-06-27
Attending: INTERNAL MEDICINE
Payer: MEDICARE

## 2018-06-29 ENCOUNTER — APPOINTMENT (OUTPATIENT)
Dept: CARDIAC REHAB | Facility: HOSPITAL | Age: 73
End: 2018-06-29
Attending: INTERNAL MEDICINE
Payer: MEDICARE

## 2018-07-02 ENCOUNTER — APPOINTMENT (OUTPATIENT)
Dept: CARDIAC REHAB | Facility: HOSPITAL | Age: 73
End: 2018-07-02
Attending: INTERNAL MEDICINE
Payer: MEDICARE

## 2018-08-11 DIAGNOSIS — E13.8 DM (DIABETES MELLITUS), SECONDARY, WITH COMPLICATIONS (HCC): ICD-10-CM

## 2018-08-11 DIAGNOSIS — I10 ESSENTIAL HYPERTENSION: ICD-10-CM

## 2018-08-11 DIAGNOSIS — N18.30 STAGE 3 CHRONIC KIDNEY DISEASE (HCC): ICD-10-CM

## 2018-08-11 DIAGNOSIS — I25.10 CORONARY ARTERY DISEASE INVOLVING NATIVE CORONARY ARTERY OF NATIVE HEART WITHOUT ANGINA PECTORIS: ICD-10-CM

## 2018-08-11 RX ORDER — FUROSEMIDE 40 MG/1
40 TABLET ORAL DAILY
Qty: 30 TABLET | Refills: 5 | Status: CANCELLED
Start: 2018-08-11

## 2018-08-11 RX ORDER — ALFUZOSIN HYDROCHLORIDE 10 MG/1
10 TABLET, EXTENDED RELEASE ORAL
Qty: 30 TABLET | Refills: 5 | Status: CANCELLED
Start: 2018-08-11

## 2018-08-12 RX ORDER — CLOPIDOGREL BISULFATE 75 MG/1
75 TABLET ORAL DAILY
Qty: 90 TABLET | Refills: 3 | Status: SHIPPED
Start: 2018-08-12 | End: 2018-08-13

## 2018-08-12 RX ORDER — ROSUVASTATIN CALCIUM 20 MG/1
20 TABLET, COATED ORAL NIGHTLY
Qty: 90 TABLET | Refills: 3 | Status: SHIPPED
Start: 2018-08-12 | End: 2018-08-13

## 2018-08-12 NOTE — TELEPHONE ENCOUNTER
From: Amparo Warner  Sent: 8/11/2018 7:14 PM CDT  Subject: Medication Renewal Request    Jemma Junior  [address] would like a refill of the following medications:     Rosuvastatin Calcium 20 MG Oral Tab Sirisha Henning NP]     Clopidogrel Bisulfate 75 MG Oral

## 2018-08-13 NOTE — TELEPHONE ENCOUNTER
From: Chito Morgan  Sent: 8/11/2018 7:14 PM CDT  Subject: Medication Renewal Request    Rushie Halsted.  [place] [address] would like a refill of the following medications:     Alfuzosin HCl ER 10 MG Oral Tablet 24 Hr Roland An MD]     furosemide 40 MG Oral Tab Roland An MD]    Preferred pharmacy: [address]        Medication renewals requested in this message routed separately:     Metoprolol Succinate ER 25 MG Oral Tablet 24 Hr [Panchito Rankin MD]       Rosuvastatin Calcium 20 MG Oral Tab Sandra Lopez NP]     Clopidogrel Bisulfate 75 MG Oral Tab Sandra Lopez NP]

## 2018-08-20 RX ORDER — FUROSEMIDE 40 MG/1
40 TABLET ORAL DAILY
Qty: 90 TABLET | Refills: 1 | Status: SHIPPED | OUTPATIENT
Start: 2018-08-20 | End: 2019-07-03 | Stop reason: ALTCHOICE

## 2018-08-20 RX ORDER — ALFUZOSIN HYDROCHLORIDE 10 MG/1
10 TABLET, EXTENDED RELEASE ORAL
Qty: 90 TABLET | Refills: 1 | Status: SHIPPED | OUTPATIENT
Start: 2018-08-20

## 2018-09-17 PROCEDURE — 82570 ASSAY OF URINE CREATININE: CPT | Performed by: INTERNAL MEDICINE

## 2018-09-17 PROCEDURE — 81001 URINALYSIS AUTO W/SCOPE: CPT | Performed by: INTERNAL MEDICINE

## 2018-09-17 PROCEDURE — 82043 UR ALBUMIN QUANTITATIVE: CPT | Performed by: INTERNAL MEDICINE

## 2018-09-17 PROCEDURE — 87086 URINE CULTURE/COLONY COUNT: CPT | Performed by: INTERNAL MEDICINE

## 2018-09-19 ENCOUNTER — OFFICE VISIT (OUTPATIENT)
Dept: NEPHROLOGY | Facility: CLINIC | Age: 73
End: 2018-09-19
Payer: COMMERCIAL

## 2018-09-19 VITALS — BODY MASS INDEX: 39 KG/M2 | SYSTOLIC BLOOD PRESSURE: 146 MMHG | DIASTOLIC BLOOD PRESSURE: 84 MMHG | WEIGHT: 251 LBS

## 2018-09-19 DIAGNOSIS — I10 ESSENTIAL HYPERTENSION: ICD-10-CM

## 2018-09-19 DIAGNOSIS — N18.4 CKD (CHRONIC KIDNEY DISEASE) STAGE 4, GFR 15-29 ML/MIN (HCC): Primary | ICD-10-CM

## 2018-09-19 PROCEDURE — 99214 OFFICE O/P EST MOD 30 MIN: CPT | Performed by: INTERNAL MEDICINE

## 2018-09-19 NOTE — PROGRESS NOTES
Nephrology Progress Note      Esha Brink is a 72year old male. HPI:   Patient presents with:  Chronic Kidney Disease  Hypertension      Mr. Carvajal 29 was seen in the nephrology clinic today in follow-up for management of chronic kidney disease stage IV s Never Used    Alcohol use: No    Drug use: No       Medications (Active prior to today's visit):    Current Outpatient Medications:  Alfuzosin HCl ER 10 MG Oral Tablet 24 Hr Take 1 tablet (10 mg total) by mouth daily with breakfast. Disp: 90 tablet Rfl: 1 lb  04/27/18 : 240 lb  04/26/18 : 241 lb 9.6 oz  04/12/18 : 245 lb 3.2 oz      General: Alert and oriented in no apparent distress.   HEENT: No scleral icterus, MMM  Neck: Supple, no THERESE or thyromegaly  Cardiac: Regular rate and rhythm, S1, S2 normal, no mu HUSAM None Seen 09/17/2018    NICKI Present (A) 02/19/2018     ASSESSMENT/PLAN:     #1.  Chronic kidney disease stage IV-he has had long-standing kidney disease related to multiple factors including diabetic nephropathy and hypertensive nephrosclerosis.   H

## 2019-02-21 ENCOUNTER — TELEPHONE (OUTPATIENT)
Dept: NEPHROLOGY | Facility: CLINIC | Age: 74
End: 2019-02-21

## 2021-01-27 DIAGNOSIS — Z23 NEED FOR VACCINATION: ICD-10-CM

## (undated) NOTE — LETTER
[facility] [address], [address]  Consent for Procedure/Sedation    Date: _____________    Time: ______________      1.  I authorize the performance upon Ricky Mishra the following:cardiac catheterization, left ventricular cinean 7. If I have a Do Not Resuscitate (DNR) order in place, the physician and I (or the individual authorized to consent on my behalf) will discuss and agree as to whether the Do Not Resuscitate (DNR) order will remain in effect or will be discontinued during